# Patient Record
Sex: FEMALE | Race: WHITE | NOT HISPANIC OR LATINO | Employment: OTHER | URBAN - NONMETROPOLITAN AREA
[De-identification: names, ages, dates, MRNs, and addresses within clinical notes are randomized per-mention and may not be internally consistent; named-entity substitution may affect disease eponyms.]

---

## 2017-01-24 ENCOUNTER — OFFICE VISIT - GICH (OUTPATIENT)
Dept: FAMILY MEDICINE | Facility: OTHER | Age: 37
End: 2017-01-24

## 2017-01-24 ENCOUNTER — HISTORY (OUTPATIENT)
Dept: FAMILY MEDICINE | Facility: OTHER | Age: 37
End: 2017-01-24

## 2017-01-24 DIAGNOSIS — F41.8 OTHER SPECIFIED ANXIETY DISORDERS: ICD-10-CM

## 2017-01-24 DIAGNOSIS — Z30.09 ENCOUNTER FOR OTHER GENERAL COUNSELING AND ADVICE ON CONTRACEPTION: ICD-10-CM

## 2017-01-24 ASSESSMENT — ANXIETY QUESTIONNAIRES
6. BECOMING EASILY ANNOYED OR IRRITABLE: SEVERAL DAYS
3. WORRYING TOO MUCH ABOUT DIFFERENT THINGS: SEVERAL DAYS
2. NOT BEING ABLE TO STOP OR CONTROL WORRYING: SEVERAL DAYS
7. FEELING AFRAID AS IF SOMETHING AWFUL MIGHT HAPPEN: SEVERAL DAYS
GAD7 TOTAL SCORE: 8
5. BEING SO RESTLESS THAT IT IS HARD TO SIT STILL: NEARLY EVERY DAY
1. FEELING NERVOUS, ANXIOUS, OR ON EDGE: NOT AT ALL
4. TROUBLE RELAXING: SEVERAL DAYS

## 2017-01-31 ENCOUNTER — COMMUNICATION - GICH (OUTPATIENT)
Dept: FAMILY MEDICINE | Facility: OTHER | Age: 37
End: 2017-01-31

## 2017-01-31 DIAGNOSIS — F41.8 OTHER SPECIFIED ANXIETY DISORDERS: ICD-10-CM

## 2017-04-12 ENCOUNTER — AMBULATORY - GICH (OUTPATIENT)
Dept: FAMILY MEDICINE | Facility: OTHER | Age: 37
End: 2017-04-12

## 2017-07-07 ENCOUNTER — AMBULATORY - GICH (OUTPATIENT)
Dept: FAMILY MEDICINE | Facility: OTHER | Age: 37
End: 2017-07-07

## 2017-09-25 ENCOUNTER — AMBULATORY - GICH (OUTPATIENT)
Dept: FAMILY MEDICINE | Facility: OTHER | Age: 37
End: 2017-09-25

## 2017-12-11 ENCOUNTER — HISTORY (OUTPATIENT)
Dept: FAMILY MEDICINE | Facility: OTHER | Age: 37
End: 2017-12-11

## 2017-12-11 ENCOUNTER — OFFICE VISIT - GICH (OUTPATIENT)
Dept: FAMILY MEDICINE | Facility: OTHER | Age: 37
End: 2017-12-11

## 2017-12-11 DIAGNOSIS — Z00.00 ENCOUNTER FOR GENERAL ADULT MEDICAL EXAMINATION WITHOUT ABNORMAL FINDINGS: ICD-10-CM

## 2017-12-11 DIAGNOSIS — Z30.09 ENCOUNTER FOR OTHER GENERAL COUNSELING AND ADVICE ON CONTRACEPTION: ICD-10-CM

## 2017-12-11 DIAGNOSIS — Z30.40 ENCOUNTER FOR SURVEILLANCE OF CONTRACEPTIVES: ICD-10-CM

## 2017-12-28 NOTE — PROGRESS NOTES
Patient Information     Patient Name MRN Sex Abril Calhoun [id number] Female 1980      Progress Notes by Romana Nieves RN at 2017  8:45 AM     Author:  Romana Nieves RN Service:  (none) Author Type:  NURS- Registered Nurse     Filed:  2017  8:50 AM Encounter Date:  2017 Status:  Signed     :  Romana Nieves RN (NURS- Registered Nurse)            Patient requests ongoing injections of Depo-Provera  Date of last DMPA:    Adverse reaction to last shot?  no  Heavy bleeding or more than 14 days bleeding since last shot?  no  Wants to continue contraception for another 3 months, knowing that fertility may not return for up to 18 months?  yes  Recent migraine headaches?  no  Breast problems since last shot?  no  Problems with excessive hair loss, acne or facial hair?  No    Reminder card given for next shot, had annual med review appt in 2017    ROMANA NIEVES RN ....................  2017   8:49 AM

## 2017-12-30 NOTE — NURSING NOTE
Patient Information     Patient Name MRN Abril Hayden [id number] Female 1980      Nursing Note by Silvina Collazo RN at 2017  8:30 AM     Author:  Silvina Collazo RN Service:  (none) Author Type:  NURS- Registered Nurse     Filed:  2017  8:35 AM Encounter Date:  2017 Status:  Signed     :  Silvina Collazo RN (NURS- Registered Nurse)            Patient requests ongoing injections of Depo-Provera  Date of last DMPA:    Adverse reaction to last shot?  no  Heavy bleeding or more than 14 days bleeding since last shot?  no  Wants to continue contraception for another 3 months, knowing that fertility may not return for up to 18 months?  yes  Recent migraine headaches?  no  Breast problems since last shot?  no  Problems with excessive hair loss, acne or facial hair?  No     SILVINA COLLAZO RN ....................  2017   8:29 AM       pt informed she will need to have a new provider before further injections are given for her orders.

## 2018-01-03 NOTE — NURSING NOTE
Patient Information     Patient Name MRN Abril Hayden [id number] Female 1980      Nursing Note by Jenniffer Barnes at 2017  9:15 AM     Author:  Jenniffer Barnes Service:  (none) Author Type:  (none)     Filed:  2017  9:18 AM Encounter Date:  2017 Status:  Signed     :  Jenniffer Barnes            Patient presents today to get her Depo shot and to talk about getting something for her anxiety of flying as she is going on a trip to [place].  Jenniffer Barnes LPN  2017  9:03 AM

## 2018-01-03 NOTE — NURSING NOTE
Patient Information     Patient Name MRN Abril Hayden [id number] Female 1980      Nursing Note by Jenniffer Barnes at 2017  9:15 AM     Author:  Jenniffer Barnes Service:  (none) Author Type:  (none)     Filed:  2017  9:56 AM Encounter Date:  2017 Status:  Signed     :  Jenniffer Barnes            Depo given today.  Next Depo is due between -2017.  Jenniffer Barnes LPN  2017  9:56 AM

## 2018-01-03 NOTE — PROGRESS NOTES
Patient Information     Patient Name MRN Abril Hayden [id number] Female 1980      Progress Notes by Deya Aponte MD at 2017  9:15 AM     Author:  Deya Aponte MD Service:  (none) Author Type:  Physician     Filed:  2017  1:18 PM Encounter Date:  2017 Status:  Signed     :  Deya Aponte MD (Physician)              SUBJECTIVE:      Abril Schulz is a 36 y.o. female who presents for an update exam and review of medications.    HPI: Healthy 36-year-old who is here for the above. She continues on Depo-Provera for contraception and also cycle control. She continues to be pleased with this, is amenorrheic, feels well. She has not had difficulty with mood changes or weight gain.  Her only concern today is that she would like to discuss an upcoming trip. She and her family are going to [place]. She is concerned about air travel, as well as the necessity of going on rides. She tends to have panicky symptoms when she is required to do those things. In the past, has used alprazolam on a very intermittent basis for air travel. She does well with this with control of anxiety symptoms, but no excess sedation. She does not anticipate any other problems and does not have symptoms of depression or anxiety when at home. Does have difficulty with motion sickness as well.  She continues to smoke about 1 pack per day and is considering using the trip to [place] as a time to quit. She has used nicotine patches in the past and will probably do so again.      PAST MEDICAL HISTORY:  Past Medical History     Diagnosis  Date     History of cervical dysplasia        SURGICAL HISTORY:  Past Surgical History       Procedure   Laterality Date     Leep procedure   2002     Knee arthroscopy   2007     right         SOCIAL HISTORY:  Social History        Substance Use Topics          Smoking status:   Current Every Day Smoker      Packs/day:  1.00      Years:  16.00      Types:   Cigarettes      Smokeless tobacco:   Never Used       Comment: has been trying       Alcohol use   0.0 oz/week     0 Standard drinks or equivalent per week        Comment: occasionally, maybe four per month       Social History Narrative     with one child.      Self-employed doing books for 's business.      FAMILY HISTORY:   Family Status     Relation  Status     Father Alive     Mother Alive    MS      Sister Alive     Brother Alive     Son Alive     Maternal Grandmother Alive     Maternal Grandfather      Paternal Grandmother      Paternal Grandfather      PMH, PSH, FMH, SH reviewed and updated.    CURRENT MEDICATIONS:   Current Outpatient Prescriptions       Medication  Sig Dispense Refill     ALPRAZolam (XANAX) 0.5 mg tablet Take 1 tablet by mouth 2 times daily if needed for Anxiety. 15 tablet 0     scopolamine 1.5mg (TRANSDERM SCOP) 1.5 mg (1 mg over 3 days) patch Apply 1 Patch on dry, clean, hairless skin every 72 hours. 4 Patch 0     Current Facility-Administered Medications         Medication  Dose Route Frequency Provider Last Rate     medroxyPROGESTERone acetate (contraceptive) 150 mg injection (DEPO-PROVERA)  150 mg Intra-Muscular q 12 weeks Deya Aponte MD       Medications have been reviewed by me and are current to the best of my knowledge and ability.    ALLERGIES:  Review of patient's allergies indicates no known allergies.    IMMUNIZATIONS:   Immunization History     Administered  Date(s) Administered     MMR 1993, 10/05/1993     Td (Age >=7 Years) 2006     Tdap 2015       REVIEW OF SYSTEMS:  General: Denies involuntary weight change, fevers, or unusual fatigue  Eyes: Denies difficulty with vision  Ears/Nose/Throat: Denies difficulty with ears or hearing. Denies difficulty swallowing.  Cardiovascular: Denies chest pain, palpitations, or exercise intolerance  Respiratory: Denies chronic cough, wheezing, or shortness of  "breath  Gastrointestinal: Denies abdominal pain, nausea, troublesome reflux, or change in bowel habits  GYN: Amenorrheic on Depo-Provera  Musculoskeletal: Denies joint pain or swelling  Skin: Denies rash  Neurologic: Denies troublesome headaches or focal neurologic symptoms  Allergic/Immunologic: Denies seasonal or perennial allergy symptoms  Psychiatric: See HPI    PHQ Depression Screening 6/3/2016   Date of PHQ exam (doc flow) 6/3/2016   1. Lack of interest/pleasure 0 - Not at all   2. Feeling down/depressed 0 - Not at all   PHQ-2 TOTAL SCORE 0         OBJECTIVE:  /76  Pulse 80  Ht 1.67 m (5' 5.75\")  Wt 67.2 kg (148 lb 4 oz)  BMI 24.11 kg/m2    EXAM:   General Appearance: Alert, well-appearing woman in NAD  HEENT: Normocephalic. TMs clear with normal landmarks. PERRLA, EOMI. Pharynx pink and moist with dentition in good repair  Neck: Supple without adenopathy or thyromegaly  Chest/Respiratory: Clear with good air exchange  Cardiovascular: Heart rate regular, normal S1 and S2, no murmur. Peripheral pulses full and symmetric  Abdomen: Soft with no mass, tenderness, or HSM  Extremities: Without deformity or edema  Skin: No rash, open areas, or suspicious lesions  Neurologic Exam: Alert and oriented ×3. No focal deficits. DTRs symmetric.  Psychiatric Exam: Alert and oriented, appropriate affect.      ASSESSMENT/PLAN:  1. CONTRACEPTIVE MANAGEMENT   - doing well on Depo-Provera. Continue same.    2. Situational anxiety   - discussed in detail. She will use transdermal scopolamine for motion sickness and is also provided with alprazolam 0.5 mg, #15, to use pre-flight and if needed during the vacation. Discussed about stopping smoking might increase her anxiety. Reviewed the use of nicotine patches.      Health maintenance reviewed and up to date except for flu shot, which she declines.          "

## 2018-01-03 NOTE — TELEPHONE ENCOUNTER
Patient Information     Patient Name MRN Sex Abril Calhoun [id number] Female 1980      Telephone Encounter by Rajani Wilkinson RN at 2017  1:39 PM     Author:  Rajani Wilkinson RN Service:  (none) Author Type:  NURS- Registered Nurse     Filed:  2017  1:43 PM Encounter Date:  2017 Status:  Signed     :  Rajani Wilkinson RN (NURS- Registered Nurse)            [facility] does not carry scopolamine patches. Medication not on RN refill protocol. New order pending to be [facility] to [facility] per patient request.  Rajani Wilkinson RN........2017 1:41 PM

## 2018-01-04 NOTE — NURSING NOTE
Patient Information     Patient Name MRN Abril Hayden [id number] Female 1980      Nursing Note by Silvina Collazo RN at 2017  8:30 AM     Author:  Silvina Collazo RN Service:  (none) Author Type:  NURS- Registered Nurse     Filed:  2017  8:31 AM Encounter Date:  2017 Status:  Signed     :  Silvina Collazo RN (NURS- Registered Nurse)            Patient requests ongoing injections of Depo-Provera  Date of last DMPA:    Adverse reaction to last shot?  no  Heavy bleeding or more than 14 days bleeding since last shot?  no  Wants to continue contraception for another 3 months, knowing that fertility may not return for up to 18 months?  yes  Recent migraine headaches?  no  Breast problems since last shot?  no  Problems with excessive hair loss, acne or facial hair?  No     SILVINA COLLAZO RN ....................  2017   8:27 AM

## 2018-01-24 ENCOUNTER — DOCUMENTATION ONLY (OUTPATIENT)
Dept: FAMILY MEDICINE | Facility: OTHER | Age: 38
End: 2018-01-24

## 2018-01-24 PROBLEM — Z30.42 DEPO-PROVERA CONTRACEPTIVE STATUS: Status: ACTIVE | Noted: 2017-12-12

## 2018-01-24 PROBLEM — Z72.0 TOBACCO ABUSE: Status: ACTIVE | Noted: 2018-01-24

## 2018-01-24 PROBLEM — R61 GENERALIZED HYPERHIDROSIS: Status: ACTIVE | Noted: 2018-01-24

## 2018-01-24 RX ORDER — ALPRAZOLAM 0.5 MG
0.5 TABLET ORAL 2 TIMES DAILY PRN
COMMUNITY
Start: 2017-01-24 | End: 2020-07-09

## 2018-01-24 RX ORDER — SCOLOPAMINE TRANSDERMAL SYSTEM 1 MG/1
1 PATCH, EXTENDED RELEASE TRANSDERMAL
COMMUNITY
Start: 2017-01-31 | End: 2020-07-09

## 2018-01-24 RX ORDER — MEDROXYPROGESTERONE ACETATE 150 MG/ML
150 INJECTION, SUSPENSION INTRAMUSCULAR
COMMUNITY
Start: 2017-12-11 | End: 2020-07-09

## 2018-01-24 RX ORDER — MEDROXYPROGESTERONE ACETATE 150 MG/ML
150 INJECTION, SUSPENSION INTRAMUSCULAR
COMMUNITY
Start: 2015-07-10 | End: 2020-07-09

## 2018-01-27 VITALS
SYSTOLIC BLOOD PRESSURE: 120 MMHG | HEIGHT: 66 IN | HEART RATE: 80 BPM | BODY MASS INDEX: 23.83 KG/M2 | DIASTOLIC BLOOD PRESSURE: 76 MMHG | WEIGHT: 148.25 LBS

## 2018-01-27 VITALS — WEIGHT: 145.2 LBS | SYSTOLIC BLOOD PRESSURE: 110 MMHG | DIASTOLIC BLOOD PRESSURE: 70 MMHG

## 2018-02-03 ASSESSMENT — ANXIETY QUESTIONNAIRES: GAD7 TOTAL SCORE: 8

## 2018-02-09 VITALS
WEIGHT: 150.4 LBS | HEIGHT: 66 IN | HEART RATE: 80 BPM | DIASTOLIC BLOOD PRESSURE: 66 MMHG | BODY MASS INDEX: 24.17 KG/M2 | SYSTOLIC BLOOD PRESSURE: 112 MMHG

## 2018-02-12 NOTE — PROGRESS NOTES
Patient Information     Patient Name MRN Sex     Abril Schulz [id number] Female 1980      Progress Notes by Gloria Laura PA-C at 2017  8:45 AM     Author:  Gloria Laura PA-C Service:  (none) Author Type:  PHYS- Physician Assistant     Filed:  2017  2:27 PM Encounter Date:  2017 Status:  Signed     :  Gloria Laura PA-C (PHYS- Physician Assistant)            Nursing Notes:   Abril Bryant  2017  8:55 AM  Signed  Patient presents to the clinic to establish care with Gloria Bryant LPN........................2017  8:44 AM      ANNUAL PHYSICAL - FEMALE    HPI: Abril Schulz is a 37 y.o. female who presents for a yearly exam.  Concerns include: No concerns at this time.    No LMP recorded. Patient is not currently having periods (Reason: Hormonally Suppressed).   Contraception: depo, need repeat. On depo for 18 years. On it for 18 years.   Risk for STI?: no  Last pap: 2015 - nl pap and HPV - repeat in 5 years  Any hx of abnormal paps:  Many years ago, no recent abnormal pap test.  FH of early CA?: no  Cholesterol/DM concerns/screening: none, declined today  Tobacco?: yes  Calcium intake: yes  DEXA: na  Last mammo: na  Colonoscopy: na  Immunizations: UTD    Patient Active Problem List      Diagnosis Date Noted     Depo-Provera contraceptive status 2017     HYPERHIDROSIS      TOBACCO ABUSE        Past Medical History:     Diagnosis  Date     History of cervical dysplasia        Past Surgical History:      Procedure  Laterality Date     KNEE ARTHROSCOPY  2007    right       LEEP PROCEDURE  2002       Social History     Social History        Marital status:       Spouse name: N/A     Number of children:  N/A     Years of education:  N/A     Occupational History      Not on file.     Social History Main Topics          Smoking status:   Current Every Day Smoker      Packs/day:  1.00      Years:  16.00      Types:   "Cigarettes      Smokeless tobacco:   Never Used       Comment: has been trying       Alcohol use   0.0 oz/week     0 Standard drinks or equivalent per week        Comment: occasionally, maybe four per month       Drug use:   No      Sexual activity:   Yes      Partners:  Male      Other Topics  Concern     Not on file      Social History Narrative      with one child.      Self-employed doing books for 's business.               Family History       Problem   Relation Age of Onset     Good Health  Father      Other  Mother      MS in remission       Endometriosis  Sister      Good Health  Brother      Heart Disease  Son       Bicuspid aortic valve       Cancer  Maternal Grandmother      Cancer  Maternal Grandfather      Cancer  Paternal Grandmother 95     Cancer  Paternal Grandfather 97       Current Outpatient Prescriptions       Medication  Sig Dispense Refill     ALPRAZolam (XANAX) 0.5 mg tablet Take 1 tablet by mouth 2 times daily if needed for Anxiety. 15 tablet 0     scopolamine 1.5mg (TRANSDERM SCOP) 1.5 mg (1 mg over 3 days) patch Apply 1 Patch on dry, clean, hairless skin every 72 hours. 4 Patch 0     Current Facility-Administered Medications         Medication  Dose Route Frequency Provider Last Rate     medroxyPROGESTERone acetate (contraceptive) 150 mg injection (DEPO-PROVERA)  150 mg Intra-Muscular q 12 weeks Deya Aponte MD       Medications have been reviewed by me and are current to the best of my knowledge and ability.       REVIEW OF SYSTEMS:  Refer to HPI.    PHYSICAL EXAM:  /66 (Cuff Site: Right Arm, Position: Sitting, Cuff Size: Adult Regular)  Pulse 80  Ht 1.664 m (5' 5.5\")  Wt 68.2 kg (150 lb 6.4 oz)  Breastfeeding? No  BMI 24.65 kg/m2  CONSTITUTIONAL:  Alert, cooperative, NAD.  EYES: No scleral icterus.  PERRLA.  Conjunctiva clear.  ENT/MOUTH: External ears and nose normal.  TMs normal.  Moist mucous membranes. Oropharynx clear.    ENDO: No thyromegaly or thyroid " nodules.  LYMPH:  No cervical or supraclavicular LA.    BREASTS: No skin abnormalities, no erythema.  No discrete masses.  No nipple discharge, no axillary, supra- or infraclavicular LA.   CARDIOVASCULAR: Regular, S1, S2.  No S3 or S4.  No murmur/gallop/rub.  No peripheral edema.  RESPIRATORY: CTA bilaterally, no wheezes, rhonchi or rales.  GI: Bowel sounds wnl.  Soft, nontender, nondistended.  No masses or HSM.  No rebound or guarding.  : Vulva: normal, no lesions or discharge  Urethral meatus: normal size and location, no lesions or discharge  Urethra: no tenderness or masses  Bladder: no fullness or tenderness  Uterus: normal size and position, mobile, non-tender  Adnexa: no palpable masses bilaterally. No cervical motion tenderness.  Pap smear obtained: no  MSKEL: Grossly normal ROM.  No clubbing.  INTEGUMENTARY:  Warm, dry.  No rash noted on exposed skin.  NEUROLOGIC: Facies symmetric.  Grossly normal movement and tone.  No tremor.  PSYCHIATRIC: Affect normal.  Speech fluent.      PHQ Depression Screen  Date of PHQ exam: 12/11/17  Over the last 2 weeks, how often have you been bothered by any of the following problems?  1. Little interest or pleasure in doing things: 0 - Not at all  2. Feeling down, depressed, or hopeless: 0 - Not at all         ASSESSMENT/PLAN:    ICD-10-CM    1. Physical exam, annual Z00.00    2. Depo-Provera contraceptive status Z30.40 medroxyPROGESTERone acetate (contraceptive) 150 mg injection (DEPO-PROVERA)   3. Birth control counseling Z30.09 AMB CONSULT TO OB-GYN       discussed her Depo-Provera birth control at length. Patient has tolerated Depo-Provera in the past. Gave side effect profile of the medication. Encouraged her to have a consult with OB/GYN in order to discuss risk factors of the Depo-Provera since she is 37 years of age and is a smoker. Encouraged her to quit smoking. Encouraged her to think about having an IUD placed as this will decrease her risk factors especially in  "the area of osteoporosis.    Gave depo today. Gave side effect profile.   Return to clinic with change/worsening of symptoms.   Return in 1 year for repeat physical. Encouraged to come in fasting for her next physical in order to have her cholesterol and diabetes check completed.    Patient Instructions   Referred for IUD consult/birth control discussion.     Healthy Strategies  1. Eat at least 3 meals a day and never skip breakfast.  2. Eat more slowly.  3. Decrease portion size.  4. Provide structure by using meal replacement bars or shakes, and/or low calorie frozen meals.  5. For good nutrition incorporate fruit, vegetables, whole grains, lean protein, and low-fat dairy.  6. Remove trigger foods from your environment to avoid impulse eating.  7. Increase physical activity: get a pedometer and aim for 10,000 steps a day or 30-35 minutes of activity 5 days per week.  8. Weigh yourself daily or at least weekly.  9. Keep a record of what you eat and your activity.  10. Establish a support system such as a friend, group or program.  11. Read [employer] [place]'s \"Eat to Live\". Remember it is important to have a minimum of 1200 calories a day, okay to use olive oil, 40 grams of fiber daily. No more than two servings ( the size of your palm) of red meat a week.     Please consider the following general health recommendations:    Eat a quality diet (generally, low in simple sugars, starches, cholesterol and saturated fat.)    Please get 1500 mg of calcium in divided doses with 1500 units vitamin D in your diet daily.     Stay physically active. Regular walking or other exercise is one of the best ways to minimize pain of arthritis; maintain independence and mobility; maintain bone strength; maintain conditioning of your heart. Find something you enjoy and a friend to do it with you.    Maintain ideal weight. Your Body mass index is Body mass index is 24.65 kg/(m^2).. Generally a BMI of 20-25 is considered ideal. Overweight is " defined as 25-30, Obese is 30-35 and markedly obese is greater than 35.    Apply sun block (SPF 25 or greater) on exposed skin anytime you are out in the sun to prevent skin cancer.     Wear a seatbelt whenever you are in a car.    Schedule a mammogram annually starting at the age of 40 years old unless recommended earlier by your primary care provider. Come for a general exam once yearly.    Colonoscopy (an exam of the colon) is recommended every 10 years after the age of 50 to screen for colon cancer. (More often if you are at increased risk.)    Consider a bone density study every 2-5 years to see if you are at increased risk for fracture. If you have osteoporosis, medicine to strengthen your bones can significantly reduce your risk of fracture, back pain, and loss of height.    A vaccine to reduce your chances of getting shingles is available if you are over 50. Information about the vaccine is available through the clinic or at:  (http://www.nlm.nih.gov/medlineplus/druginfo/medmaster/c935368.html)    Receive a pneumonia shot once after the age of 65 (repeat in 5 years if you have other risk factors). This does not prevent all types of pneumonia, but reduces the risk of the worst bacterial cause of pneumonia.    You should have a tetanus booster at least once every 10 years.    Obtain a flu shot every fall.    Check blood sugar annually. Cholesterol annually unless you have had a normal level when last checked within 5 years.     I recommend that you have a general physical exam every year. You should have a pap every 3-5 years between the ages of 21 and 65 depending on your test unless you have had previous abnormal pap smear, (in these cases the exams and PAP's should be done yearly). If you have had hysterectomy in the past, your future Pap plan may be different.            Index [place] Related topics   Intrauterine Device (IUD)   ________________________________________________________________________  RITTER  POINTS    An intrauterine device (IUD) is a T-shaped birth control device put into a woman's uterus by her healthcare provider. IUDs don t keep you from getting HIV/AIDS or other sexually transmitted diseases.    IUDs offer long-lasting birth control. Usually progesterone IUDs are replaced after 3 to 5 years, depending on the brand. Copper IUDs need to be replaced after 10 years.    You need to see your healthcare provider regularly for checkups as long as you have the IUD in place.  ________________________________________________________________________  What is an intrauterine device (IUD)?  An intrauterine device (IUD) is a birth control device put into a woman's uterus by her healthcare provider. The uterus is the muscular organ at the top of the vagina. Babies grow in the uterus, and menstrual blood comes from the uterus, through the cervix. It is T-shaped with a string attached. There are 2 types of IUDs.     IUDs that contain copper    IUDs that contain the female hormone progesterone  The IUD prevents pregnancy in several ways:    It kills or damages sperm to keep them from reaching your eggs.    It can slow the movement of eggs to your uterus.    It can make the eggs unable to be fertilized even if sperm are present.    It may keep a fertilized egg from implanting in the uterus.  Talk to your healthcare provider about the risks of using an IUD if:    You have cancer in the uterus or cervix    You have unexplained vaginal bleeding    You may be pregnant    You have had pelvic inflammatory disease    You have had a severe infection of the cervix    You have growths called fibroids or other problems with your uterus that make it hard to insert the IUD    You change sexual partners often or if you or your partner has more than 1 sexual partner    You have had either a pregnancy or infection in the fallopian tubes that lead from the ovaries to the uterus  You should not use a copper IUD if you are allergic to  copper or metals.  How is it used?  Your healthcare provider will put the IUD into your uterus through the vagina and cervix (the cervix is the opening of the uterus). The IUD is usually inserted during a menstrual period, when the cervix is slightly open and you are least likely to be pregnant. It takes only a few minutes to insert an IUD. You may feel some cramping pain while it is being put into place.  The copper IUD can also be used for emergency birth control. It can be inserted up to 5 days after unprotected sex. Studies have shown it works to prevent pregnancy 99.9% of the time when it is used in this way.  What happens after I get an IUD?  The IUD could come out accidentally in the first few months after its placement, possibly without your knowing it. You might want to use a backup method of birth control during the first few months, just to be safe.  Your healthcare provider may want to examine you within 3 months after the insertion of the IUD to be sure that everything is normal. You need to see your healthcare provider regularly for checkups as long as you have the IUD in place. Talk with your provider about this.  During the first few months after insertion of the IUD, check often for the string attached to it to be sure that the IUD is still in the uterus. The string passes from the IUD inside the uterus through the cervix and into the vagina. The end of the string usually stays out of the way at the top of the vagina. You can check for the string by putting a finger inside the vagina and feeling for the string near the cervix. Be careful not to pull on the string. Also check for the string after every menstrual period and before you have sex. As long as you can feel the string, the IUD is in position and it is unlikely that you will get pregnant. If you feel the hard plastic of the IUD, it is no longer in the right place and you need to see your healthcare provider to change it.  Usually  progesterone IUDs are replaced after 3 to 5 years, depending on the brand. Copper IUDs need to be replaced after 10 years. Removal or replacement of an IUD must be done by your healthcare provider. Do not try to remove the IUD yourself.  What are the benefits?    Fewer than 1 out of 100 women who use an IUD for birth control get pregnant during 1 year of use.    It does not cost much.    Sex does not need to be interrupted by putting on a condom, or using a diaphragm or spermicide.    If you have a progesterone-containing IUD, you will have less bleeding and cramps during your periods. Sometimes you may skip menstrual periods with this type of IUD.    An IUD starts preventing pregnancy as soon as it is inserted and continues to prevent pregnancy for 3 to 10 years, depending on the type.    If you want to have children, your ability to get pregnant returns quickly after the IUD is removed.  What are the risks?    You may have spotting between periods or increased menstrual bleeding and cramps, mostly during the first few months of use.    The string may irritate your partner's penis.    The IUD may get stuck in the uterine wall, or damage the uterus or other organs. Talk with your healthcare provider before you have deep heat treatment or therapeutic ultrasound while your IUD is in place. These treatments may increase the risk of injury to the tissues of the uterus.    You may have a higher risk of pelvic inflammatory disease, which can lead to being unable to get pregnant.    If the IUD falls out, you may have an unexpected pregnancy.    IUDs don t keep you from getting infected with HIV/AIDS or other sexually transmitted diseases.    Latex or polyurethane condoms are the only method of birth control that can protect against HIV/AIDS.    If you get pregnant with an IUD in place, tell your healthcare provider right away. The IUD should be removed right away to prevent a tubal (ectopic) pregnancy, miscarriage (loss of  the baby), infection in the uterus, or premature birth of the baby.  Developed by [employer].  Adult Advisor 2017.2 published by [employer].  Last modified: 2016-11-02  Last reviewed: 2016-10-31  This content is reviewed periodically and is subject to change as new health information becomes available. The information is intended to inform and educate and is not a replacement for medical evaluation, advice, diagnosis or treatment by a healthcare professional.  References   Adult Advisor 2017.2 Index    Copyright   2017 [employer], a division of [employer] All rights reserved.          Index [place] Related topics   Depo-Provera   ________________________________________________________________________  KEY POINTS    Depo-Provera is a shot that contains a form of the female hormone progesterone. The shot is given every 3 months to prevent pregnancy.    Depo-Provera may increase your risk for heart disease. It may also increase your risk for stroke, blood clots, some liver problems, and possibly depression or diabetes.    Ask your healthcare provider or pharmacist what side effects the medicine may cause and what you should do if you have side effects.  ________________________________________________________________________  What is Depo-Provera used for?  Depo-Provera is a shot used to prevent pregnancy. You need to get the shot every 3 months (4 times a year) from your healthcare provider. The shot is usually given during the first 5 days of your menstrual period.  Depo-Provera is one of the most reliable forms of birth control.  How does it work?  Depo-Provera is a man-made form of the female hormone progesterone. Progesterone is one of the hormones used in birth control pills. After a shot of Depo-Provera, the high level of progesterone in the body keeps the ovaries from releasing an egg for the next 3 months. If the ovaries don t release an egg, you cannot get pregnant. The hormone also causes  a thickening of the mucus on the cervix and changes the lining of the uterus. These changes also help prevent pregnancy.  When you take Depo-Provera, your periods may be lighter and less painful. The symptoms of premenstrual syndrome (PMS) may not be as bothersome.  What else do I need to know about this medicine?    Depo-Provera may increase your risk for heart disease. It may also increase your risk for stroke, blood clots, some liver problems, and possibly depression or diabetes. You and your healthcare provider should discuss the risks and benefits of taking Depo-Provera.    Follow the directions that come with your medicine, including information about food or alcohol. Make sure you know how and when to take your medicine. Remember that you may not be protected from pregnancy if you do not get your shots on schedule.    This medicine does not keep you from getting AIDS or other sexually transmitted diseases. Latex or polyurethane condoms are the only method of birth control that can protect against the HIV virus and AIDS.    This medicine may decrease the amount of calcium in your bones. This may give you a higher risk of osteoporosis and broken bones. Ask your healthcare provider if you need preventive care for this.    Many medicines have side effects. A side effect is a symptom or problem that is caused by the medicine. Ask your healthcare provider or pharmacist what side effects the medicine may cause and what you should do if you have side effects.    Smoking while you are using this medicine increases the risk of serious side effects. Talk to your healthcare provider about ways to quit smoking.    Keep a list of your medicines with you. List all of the prescription medicines, nonprescription medicines, supplements, natural remedies, and vitamins that you take. Tell all healthcare providers who treat you about all of the products you are taking.    Try to get all of your prescriptions filled at the same  place. Your pharmacist can help make sure that all of your medicines are safe to take together.  If you have any questions, ask your healthcare provider or pharmacist for more information. Be sure to keep all appointments for provider visits or tests.  Developed by [employer].  Adult Advisor 2017.2 published by [employer].  Last modified: 2016-02-23  Last reviewed: 2017-03-15  This content is reviewed periodically and is subject to change as new health information becomes available. The information is intended to inform and educate and is not a replacement for medical evaluation, advice, diagnosis or treatment by a healthcare professional.  References   Adult Advisor 2017.2 Index    Copyright   2017 [employer], a division of [employer] All rights reserved.           Relevant cancer screening discussed.    Counseled on healthy diet, Calcium and vitamin D intake, and exercise.    Gloria Laura PA-C ....................  12/11/2017   8:55 AM

## 2018-02-12 NOTE — PATIENT INSTRUCTIONS
"Patient Information     Patient Name MRAbril Calhoun [id number] Female 1980      Patient Instructions by Gloria Laura PA-C at 2017  9:12 AM     Author:  Gloria Laura PA-C  Service:  (none) Author Type:  PHYS- Physician Assistant     Filed:  2017  9:12 AM  Encounter Date:  2017 Status:  Addendum     :  Gloria Laura PA-C (PHYS- Physician Assistant)        Related Notes: Original Note by Gloria Laura PA-C (PHYS- Physician Assistant) filed at 2017  9:12 AM            Referred for IUD consult/birth control discussion.     Healthy Strategies  1. Eat at least 3 meals a day and never skip breakfast.  2. Eat more slowly.  3. Decrease portion size.  4. Provide structure by using meal replacement bars or shakes, and/or low calorie frozen meals.  5. For good nutrition incorporate fruit, vegetables, whole grains, lean protein, and low-fat dairy.  6. Remove trigger foods from your environment to avoid impulse eating.  7. Increase physical activity: get a pedometer and aim for 10,000 steps a day or 30-35 minutes of activity 5 days per week.  8. Weigh yourself daily or at least weekly.  9. Keep a record of what you eat and your activity.  10. Establish a support system such as a friend, group or program.  11. Read [place]'s \"Eat to Live\". Remember it is important to have a minimum of 1200 calories a day, okay to use olive oil, 40 grams of fiber daily. No more than two servings ( the size of your palm) of red meat a week.     Please consider the following general health recommendations:    Eat a quality diet (generally, low in simple sugars, starches, cholesterol and saturated fat.)    Please get 1500 mg of calcium in divided doses with 1500 units vitamin D in your diet daily.     Stay physically active. Regular walking or other exercise is one of the best ways to minimize pain of arthritis; maintain independence and mobility; maintain bone strength; maintain " conditioning of your heart. Find something you enjoy and a friend to do it with you.    Maintain ideal weight. Your Body mass index is Body mass index is 24.65 kg/(m^2).. Generally a BMI of 20-25 is considered ideal. Overweight is defined as 25-30, Obese is 30-35 and markedly obese is greater than 35.    Apply sun block (SPF 25 or greater) on exposed skin anytime you are out in the sun to prevent skin cancer.     Wear a seatbelt whenever you are in a car.    Schedule a mammogram annually starting at the age of 40 years old unless recommended earlier by your primary care provider. Come for a general exam once yearly.    Colonoscopy (an exam of the colon) is recommended every 10 years after the age of 50 to screen for colon cancer. (More often if you are at increased risk.)    Consider a bone density study every 2-5 years to see if you are at increased risk for fracture. If you have osteoporosis, medicine to strengthen your bones can significantly reduce your risk of fracture, back pain, and loss of height.    A vaccine to reduce your chances of getting shingles is available if you are over 50. Information about the vaccine is available through the clinic or at:  (http://www.nlm.nih.gov/medlineplus/druginfo/medmaster/k121206.html)    Receive a pneumonia shot once after the age of 65 (repeat in 5 years if you have other risk factors). This does not prevent all types of pneumonia, but reduces the risk of the worst bacterial cause of pneumonia.    You should have a tetanus booster at least once every 10 years.    Obtain a flu shot every fall.    Check blood sugar annually. Cholesterol annually unless you have had a normal level when last checked within 5 years.     I recommend that you have a general physical exam every year. You should have a pap every 3-5 years between the ages of 21 and 65 depending on your test unless you have had previous abnormal pap smear, (in these cases the exams and PAP's should be done  yearly). If you have had hysterectomy in the past, your future Pap plan may be different.            Index [place] Related topics   Intrauterine Device (IUD)   ________________________________________________________________________  KEY POINTS    An intrauterine device (IUD) is a T-shaped birth control device put into a woman's uterus by her healthcare provider. IUDs don t keep you from getting HIV/AIDS or other sexually transmitted diseases.    IUDs offer long-lasting birth control. Usually progesterone IUDs are replaced after 3 to 5 years, depending on the brand. Copper IUDs need to be replaced after 10 years.    You need to see your healthcare provider regularly for checkups as long as you have the IUD in place.  ________________________________________________________________________  What is an intrauterine device (IUD)?  An intrauterine device (IUD) is a birth control device put into a woman's uterus by her healthcare provider. The uterus is the muscular organ at the top of the vagina. Babies grow in the uterus, and menstrual blood comes from the uterus, through the cervix. It is T-shaped with a string attached. There are 2 types of IUDs.     IUDs that contain copper    IUDs that contain the female hormone progesterone  The IUD prevents pregnancy in several ways:    It kills or damages sperm to keep them from reaching your eggs.    It can slow the movement of eggs to your uterus.    It can make the eggs unable to be fertilized even if sperm are present.    It may keep a fertilized egg from implanting in the uterus.  Talk to your healthcare provider about the risks of using an IUD if:    You have cancer in the uterus or cervix    You have unexplained vaginal bleeding    You may be pregnant    You have had pelvic inflammatory disease    You have had a severe infection of the cervix    You have growths called fibroids or other problems with your uterus that make it hard to insert the IUD    You change sexual  partners often or if you or your partner has more than 1 sexual partner    You have had either a pregnancy or infection in the fallopian tubes that lead from the ovaries to the uterus  You should not use a copper IUD if you are allergic to copper or metals.  How is it used?  Your healthcare provider will put the IUD into your uterus through the vagina and cervix (the cervix is the opening of the uterus). The IUD is usually inserted during a menstrual period, when the cervix is slightly open and you are least likely to be pregnant. It takes only a few minutes to insert an IUD. You may feel some cramping pain while it is being put into place.  The copper IUD can also be used for emergency birth control. It can be inserted up to 5 days after unprotected sex. Studies have shown it works to prevent pregnancy 99.9% of the time when it is used in this way.  What happens after I get an IUD?  The IUD could come out accidentally in the first few months after its placement, possibly without your knowing it. You might want to use a backup method of birth control during the first few months, just to be safe.  Your healthcare provider may want to examine you within 3 months after the insertion of the IUD to be sure that everything is normal. You need to see your healthcare provider regularly for checkups as long as you have the IUD in place. Talk with your provider about this.  During the first few months after insertion of the IUD, check often for the string attached to it to be sure that the IUD is still in the uterus. The string passes from the IUD inside the uterus through the cervix and into the vagina. The end of the string usually stays out of the way at the top of the vagina. You can check for the string by putting a finger inside the vagina and feeling for the string near the cervix. Be careful not to pull on the string. Also check for the string after every menstrual period and before you have sex. As long as you can  feel the string, the IUD is in position and it is unlikely that you will get pregnant. If you feel the hard plastic of the IUD, it is no longer in the right place and you need to see your healthcare provider to change it.  Usually progesterone IUDs are replaced after 3 to 5 years, depending on the brand. Copper IUDs need to be replaced after 10 years. Removal or replacement of an IUD must be done by your healthcare provider. Do not try to remove the IUD yourself.  What are the benefits?    Fewer than 1 out of 100 women who use an IUD for birth control get pregnant during 1 year of use.    It does not cost much.    Sex does not need to be interrupted by putting on a condom, or using a diaphragm or spermicide.    If you have a progesterone-containing IUD, you will have less bleeding and cramps during your periods. Sometimes you may skip menstrual periods with this type of IUD.    An IUD starts preventing pregnancy as soon as it is inserted and continues to prevent pregnancy for 3 to 10 years, depending on the type.    If you want to have children, your ability to get pregnant returns quickly after the IUD is removed.  What are the risks?    You may have spotting between periods or increased menstrual bleeding and cramps, mostly during the first few months of use.    The string may irritate your partner's penis.    The IUD may get stuck in the uterine wall, or damage the uterus or other organs. Talk with your healthcare provider before you have deep heat treatment or therapeutic ultrasound while your IUD is in place. These treatments may increase the risk of injury to the tissues of the uterus.    You may have a higher risk of pelvic inflammatory disease, which can lead to being unable to get pregnant.    If the IUD falls out, you may have an unexpected pregnancy.    IUDs don t keep you from getting infected with HIV/AIDS or other sexually transmitted diseases.    Latex or polyurethane condoms are the only method of  birth control that can protect against HIV/AIDS.    If you get pregnant with an IUD in place, tell your healthcare provider right away. The IUD should be removed right away to prevent a tubal (ectopic) pregnancy, miscarriage (loss of the baby), infection in the uterus, or premature birth of the baby.  Developed by [employer].  Adult Advisor 2017.2 published by [employer].  Last modified: 2016-11-02  Last reviewed: 2016-10-31  This content is reviewed periodically and is subject to change as new health information becomes available. The information is intended to inform and educate and is not a replacement for medical evaluation, advice, diagnosis or treatment by a healthcare professional.  References   Adult Advisor 2017.2 Index    Copyright   2017 [employer], a division of [employer] All rights reserved.          Index [place] Related topics   Depo-Provera   ________________________________________________________________________  KEY POINTS    Depo-Provera is a shot that contains a form of the female hormone progesterone. The shot is given every 3 months to prevent pregnancy.    Depo-Provera may increase your risk for heart disease. It may also increase your risk for stroke, blood clots, some liver problems, and possibly depression or diabetes.    Ask your healthcare provider or pharmacist what side effects the medicine may cause and what you should do if you have side effects.  ________________________________________________________________________  What is Depo-Provera used for?  Depo-Provera is a shot used to prevent pregnancy. You need to get the shot every 3 months (4 times a year) from your healthcare provider. The shot is usually given during the first 5 days of your menstrual period.  Depo-Provera is one of the most reliable forms of birth control.  How does it work?  Depo-Provera is a man-made form of the female hormone progesterone. Progesterone is one of the hormones used in birth  control pills. After a shot of Depo-Provera, the high level of progesterone in the body keeps the ovaries from releasing an egg for the next 3 months. If the ovaries don t release an egg, you cannot get pregnant. The hormone also causes a thickening of the mucus on the cervix and changes the lining of the uterus. These changes also help prevent pregnancy.  When you take Depo-Provera, your periods may be lighter and less painful. The symptoms of premenstrual syndrome (PMS) may not be as bothersome.  What else do I need to know about this medicine?    Depo-Provera may increase your risk for heart disease. It may also increase your risk for stroke, blood clots, some liver problems, and possibly depression or diabetes. You and your healthcare provider should discuss the risks and benefits of taking Depo-Provera.    Follow the directions that come with your medicine, including information about food or alcohol. Make sure you know how and when to take your medicine. Remember that you may not be protected from pregnancy if you do not get your shots on schedule.    This medicine does not keep you from getting AIDS or other sexually transmitted diseases. Latex or polyurethane condoms are the only method of birth control that can protect against the HIV virus and AIDS.    This medicine may decrease the amount of calcium in your bones. This may give you a higher risk of osteoporosis and broken bones. Ask your healthcare provider if you need preventive care for this.    Many medicines have side effects. A side effect is a symptom or problem that is caused by the medicine. Ask your healthcare provider or pharmacist what side effects the medicine may cause and what you should do if you have side effects.    Smoking while you are using this medicine increases the risk of serious side effects. Talk to your healthcare provider about ways to quit smoking.    Keep a list of your medicines with you. List all of the prescription  medicines, nonprescription medicines, supplements, natural remedies, and vitamins that you take. Tell all healthcare providers who treat you about all of the products you are taking.    Try to get all of your prescriptions filled at the same place. Your pharmacist can help make sure that all of your medicines are safe to take together.  If you have any questions, ask your healthcare provider or pharmacist for more information. Be sure to keep all appointments for provider visits or tests.  Developed by [employer].  Adult Advisor 2017.2 published by [employer].  Last modified: 2016-02-23  Last reviewed: 2017-03-15  This content is reviewed periodically and is subject to change as new health information becomes available. The information is intended to inform and educate and is not a replacement for medical evaluation, advice, diagnosis or treatment by a healthcare professional.  References   Adult Advisor 2017.2 Index    Copyright   2017 [employer], a division of [employer] All rights reserved.

## 2018-02-12 NOTE — NURSING NOTE
Patient Information     Patient Name MRN Abril Hayden [id number] Female 1980      Nursing Note by Abril Bryant at 2017  8:45 AM     Author:  Abril Bryant Service:  (none) Author Type:  (none)     Filed:  2017  8:55 AM Encounter Date:  2017 Status:  Signed     :  Abril Bryant            Patient presents to the clinic to establish care with Gloria Bryant LPN........................2017  8:44 AM

## 2018-02-22 ENCOUNTER — TELEPHONE (OUTPATIENT)
Dept: FAMILY MEDICINE | Facility: OTHER | Age: 38
End: 2018-02-22

## 2018-03-25 ENCOUNTER — HEALTH MAINTENANCE LETTER (OUTPATIENT)
Age: 38
End: 2018-03-25

## 2018-07-03 ENCOUNTER — HEALTH MAINTENANCE LETTER (OUTPATIENT)
Age: 38
End: 2018-07-03

## 2020-07-09 ENCOUNTER — OFFICE VISIT (OUTPATIENT)
Dept: FAMILY MEDICINE | Facility: OTHER | Age: 40
End: 2020-07-09
Attending: PHYSICIAN ASSISTANT
Payer: COMMERCIAL

## 2020-07-09 VITALS
BODY MASS INDEX: 22.62 KG/M2 | SYSTOLIC BLOOD PRESSURE: 102 MMHG | WEIGHT: 138 LBS | HEART RATE: 64 BPM | DIASTOLIC BLOOD PRESSURE: 62 MMHG | RESPIRATION RATE: 16 BRPM | TEMPERATURE: 98 F

## 2020-07-09 DIAGNOSIS — L98.9 SKIN LESION: Primary | ICD-10-CM

## 2020-07-09 PROCEDURE — 99213 OFFICE O/P EST LOW 20 MIN: CPT | Performed by: PHYSICIAN ASSISTANT

## 2020-07-09 NOTE — NURSING NOTE
Chief Complaint   Patient presents with     Mole     On collarbone    Medication Reconciliation: complete      Sarita Waldron, LPN

## 2020-07-09 NOTE — PROGRESS NOTES
Nursing Notes:   Sarita Waldron LPN  7/9/2020  1:43 PM  Signed  Chief Complaint   Patient presents with     Mole     On collarbone    Medication Reconciliation: complete      Sarita Waldron LPN      HPI:    Abril Schulz is a 39 year old female who presents for a mole on her collarbone.  She has had a mole on her right medial collarbone for many years.  May have been there as a child.  Recently she has noticed that it is getting a little darker and larger.  Wondering about having it removed.    Past Medical History:   Diagnosis Date     History of cervical dysplasia     2002       Past Surgical History:   Procedure Laterality Date     ARTHROSCOPY KNEE      2007,right     CONIZATION LEEP      2002       Family History   Problem Relation Age of Onset     Family History Negative Father         Good Health     Other - See Comments Mother         MS in remission     Other - See Comments Sister         Endometriosis     Family History Negative Brother         Good Health     Heart Disease Son         Heart Disease, Bicuspid aortic valve     Cancer Maternal Grandmother         Cancer     Cancer Maternal Grandfather         Cancer     Cancer Paternal Grandmother 95        Cancer     Cancer Paternal Grandfather 97        Cancer       Social History     Tobacco Use     Smoking status: Current Every Day Smoker     Packs/day: 1.00     Years: 16.00     Pack years: 16.00     Types: Cigarettes     Smokeless tobacco: Never Used     Tobacco comment: Quit smoking: has been trying   Substance Use Topics     Alcohol use: Yes     Alcohol/week: 0.0 standard drinks     Comment: Alcoholic Drinks/day: occasionally, maybe four per month       No current outpatient medications on file.       No Known Allergies    REVIEW OF SYSTEMS:  Refer to HPI.    EXAM:   Vitals:    /62 (BP Location: Right arm, Patient Position: Sitting, Cuff Size: Adult Regular)   Pulse 64   Temp 98  F (36.7  C)   Resp 16   Wt 62.6 kg  (138 lb)   BMI 22.62 kg/m      General Appearance: Pleasant, alert, appropriate appearance for age. No acute distress  Skin: Approximate 4 x 4 millimeter dark brown, slightly raised papule appreciated on the right medial collarbone region.  No surrounding erythema, open wound, pus, drainage, warmth.  Psychiatric Exam: Alert and oriented - appropriate affect.    PHQ Depression Screen  No flowsheet data found.    ASSESSMENT AND PLAN:      ICD-10-CM    1. Skin lesion  L98.9 GENERAL SURG ADULT REFERRAL         Referred to general surgery for skin consult and probable mole removal.    Gloria Laura PA-C PA-C..................7/9/2020 1:42 PM

## 2020-07-14 ENCOUNTER — OFFICE VISIT (OUTPATIENT)
Dept: SURGERY | Facility: OTHER | Age: 40
End: 2020-07-14
Attending: PHYSICIAN ASSISTANT
Payer: COMMERCIAL

## 2020-07-14 VITALS
BODY MASS INDEX: 22.62 KG/M2 | TEMPERATURE: 97.5 F | SYSTOLIC BLOOD PRESSURE: 118 MMHG | WEIGHT: 138 LBS | DIASTOLIC BLOOD PRESSURE: 72 MMHG | RESPIRATION RATE: 16 BRPM | HEART RATE: 72 BPM

## 2020-07-14 DIAGNOSIS — L98.9 SKIN LESION: Primary | ICD-10-CM

## 2020-07-14 PROCEDURE — 11422 EXC H-F-NK-SP B9+MARG 1.1-2: CPT | Performed by: SURGERY

## 2020-07-14 PROCEDURE — 88305 TISSUE EXAM BY PATHOLOGIST: CPT

## 2020-07-14 ASSESSMENT — PAIN SCALES - GENERAL: PAINLEVEL: NO PAIN (0)

## 2020-07-14 NOTE — NURSING NOTE
"Chief Complaint   Patient presents with     Procedure     upper right chest skin lesion       Initial /72 (BP Location: Right arm, Patient Position: Sitting, Cuff Size: Adult Regular)   Pulse 72   Temp 97.5  F (36.4  C) (Tympanic)   Resp 16   Wt 62.6 kg (138 lb)   LMP 07/13/2020 (Exact Date)   Breastfeeding No   BMI 22.62 kg/m   Estimated body mass index is 22.62 kg/m  as calculated from the following:    Height as of 12/11/17: 1.664 m (5' 5.5\").    Weight as of this encounter: 62.6 kg (138 lb).  Medication Reconciliation: complete    Oxana Serrano LPN    "

## 2020-07-14 NOTE — PROGRESS NOTES
SUBJECTIVE:  39 year old female presents for lesion removal. This lesion has been present for a long time but is getting larger and darker.    OBJECTIVE:  /72 (BP Location: Right arm, Patient Position: Sitting, Cuff Size: Adult Regular)   Pulse 72   Temp 97.5  F (36.4  C) (Tympanic)   Resp 16   Wt 62.6 kg (138 lb)   LMP 07/13/2020 (Exact Date)   Breastfeeding No   BMI 22.62 kg/m    General: no acute distress  Skin: right lower neck above clavicle, 0.6 x 0.5 cm dark brown lesion with regular border, no ulceration    ASSESSMENT:  Lesion size:  As above  Defect size: lesion and margin : 1.2 x 0.9 x 0.3 cm-simple closure    PROCEDURE:  The pathophysiology of skin lesions and skin cancers was discussed with the patient. The risks, benefits and alternatives to excision of the lesion were discussed with the patient, including the risks of infection,scarring, bruising, bleeding and the possible need for further procedures. The patient expressed understanding and wishes to proceed.    Chloraprep was used to cleanse the skinin the area of the lesion. 1% Lidocaine with epinephrine was infiltrated in the skin and subcutaneous tissue in the area of the lesion. When appropriate anesthesia had been achieved, the lesion was sharply excised with a margin of grossly normal tissue. The wound was closed using 4-0 Monocryl for the skin. Sterile dressing was applied. The specimen was labelled and sent to pathology for evaluation. The procedure was well tolerated without complications. Patient was given post procedure instructions and denied further questions. We will call the patient with pathology results.

## 2020-07-14 NOTE — PATIENT INSTRUCTIONS
Your incision was closed with stitches that will dissolve.     It is ok to remove the dressing and get the incision wet in the shower on the day after your procedure.     Don't soak in a tub, pool or lake for 5 days. The small butterfly strips will start to peel off in 5-7 days it is ok to remove them.     We will call you with the pathology results.  If you have concerns, please call.

## 2020-07-14 NOTE — NURSING NOTE
Prior to the start of the procedure and with procedural staff participation, I verbally confirmed the patient s identity using two indicators, relevant allergies, that the procedure was appropriate and matched the consent or emergent situation, and that the correct equipment/implants were available. Immediately prior to starting the procedure I conducted the Time Out with the procedural staff and re-confirmed the patient s name, procedure, and site/side. (The Joint Commission universal protocol was followed.)  Yes    Sedation (Moderate or Deep): None  Oaxna Serrano LPN .......7/14/2020 3:27 PM

## 2021-01-03 ENCOUNTER — HEALTH MAINTENANCE LETTER (OUTPATIENT)
Age: 41
End: 2021-01-03

## 2021-10-09 ENCOUNTER — HEALTH MAINTENANCE LETTER (OUTPATIENT)
Age: 41
End: 2021-10-09

## 2021-11-15 ENCOUNTER — MYC MEDICAL ADVICE (OUTPATIENT)
Dept: FAMILY MEDICINE | Facility: OTHER | Age: 41
End: 2021-11-15
Payer: COMMERCIAL

## 2021-11-15 DIAGNOSIS — F41.9 ANXIETY: Primary | ICD-10-CM

## 2021-11-15 RX ORDER — ALPRAZOLAM 0.5 MG
0.5 TABLET ORAL 2 TIMES DAILY PRN
Qty: 10 TABLET | Refills: 0 | Status: SHIPPED | OUTPATIENT
Start: 2021-11-15 | End: 2023-02-10

## 2022-01-29 ENCOUNTER — HEALTH MAINTENANCE LETTER (OUTPATIENT)
Age: 42
End: 2022-01-29

## 2022-09-17 ENCOUNTER — HEALTH MAINTENANCE LETTER (OUTPATIENT)
Age: 42
End: 2022-09-17

## 2022-11-15 ENCOUNTER — APPOINTMENT (OUTPATIENT)
Dept: CT IMAGING | Facility: OTHER | Age: 42
DRG: 854 | End: 2022-11-15
Attending: FAMILY MEDICINE
Payer: COMMERCIAL

## 2022-11-15 ENCOUNTER — ANESTHESIA (OUTPATIENT)
Dept: SURGERY | Facility: OTHER | Age: 42
DRG: 854 | End: 2022-11-15
Payer: COMMERCIAL

## 2022-11-15 ENCOUNTER — ANESTHESIA EVENT (OUTPATIENT)
Dept: SURGERY | Facility: OTHER | Age: 42
DRG: 854 | End: 2022-11-15
Payer: COMMERCIAL

## 2022-11-15 ENCOUNTER — HOSPITAL ENCOUNTER (INPATIENT)
Facility: OTHER | Age: 42
LOS: 1 days | Discharge: HOME | DRG: 854 | End: 2022-11-17
Attending: FAMILY MEDICINE | Admitting: INTERNAL MEDICINE
Payer: COMMERCIAL

## 2022-11-15 ENCOUNTER — APPOINTMENT (OUTPATIENT)
Dept: GENERAL RADIOLOGY | Facility: OTHER | Age: 42
DRG: 854 | End: 2022-11-15
Attending: UROLOGY
Payer: COMMERCIAL

## 2022-11-15 ENCOUNTER — APPOINTMENT (OUTPATIENT)
Dept: ULTRASOUND IMAGING | Facility: OTHER | Age: 42
DRG: 854 | End: 2022-11-15
Attending: FAMILY MEDICINE
Payer: COMMERCIAL

## 2022-11-15 DIAGNOSIS — Z11.52 ENCOUNTER FOR SCREENING LABORATORY TESTING FOR COVID-19 VIRUS: ICD-10-CM

## 2022-11-15 DIAGNOSIS — N20.1 LEFT URETERAL STONE: ICD-10-CM

## 2022-11-15 DIAGNOSIS — N39.0 URINARY TRACT INFECTION WITHOUT HEMATURIA, SITE UNSPECIFIED: Primary | ICD-10-CM

## 2022-11-15 PROBLEM — N13.30 HYDRONEPHROSIS: Status: ACTIVE | Noted: 2022-11-15

## 2022-11-15 PROBLEM — D72.829 LEUKOCYTOSIS: Status: ACTIVE | Noted: 2022-11-15

## 2022-11-15 LAB
ALBUMIN UR-MCNC: 70 MG/DL
AMORPH CRY #/AREA URNS HPF: ABNORMAL /HPF
ANION GAP SERPL CALCULATED.3IONS-SCNC: 9 MMOL/L (ref 7–15)
APPEARANCE UR: ABNORMAL
BACTERIA #/AREA URNS HPF: ABNORMAL /HPF
BASOPHILS # BLD AUTO: 0 10E3/UL (ref 0–0.2)
BASOPHILS NFR BLD AUTO: 0 %
BILIRUB UR QL STRIP: NEGATIVE
BUN SERPL-MCNC: 15.4 MG/DL (ref 6–20)
CALCIUM SERPL-MCNC: 8.7 MG/DL (ref 8.6–10)
CHLORIDE SERPL-SCNC: 106 MMOL/L (ref 98–107)
COLOR UR AUTO: YELLOW
CREAT SERPL-MCNC: 0.95 MG/DL (ref 0.51–0.95)
CRP SERPL-MCNC: <3 MG/L
DEPRECATED HCO3 PLAS-SCNC: 23 MMOL/L (ref 22–29)
EOSINOPHIL # BLD AUTO: 0 10E3/UL (ref 0–0.7)
EOSINOPHIL NFR BLD AUTO: 0 %
ERYTHROCYTE [DISTWIDTH] IN BLOOD BY AUTOMATED COUNT: 12.1 % (ref 10–15)
FLUAV RNA SPEC QL NAA+PROBE: NEGATIVE
FLUBV RNA RESP QL NAA+PROBE: NEGATIVE
GFR SERPL CREATININE-BSD FRML MDRD: 76 ML/MIN/1.73M2
GLUCOSE SERPL-MCNC: 99 MG/DL (ref 70–99)
GLUCOSE UR STRIP-MCNC: NEGATIVE MG/DL
HCG UR QL: NEGATIVE
HCT VFR BLD AUTO: 38.4 % (ref 35–47)
HGB BLD-MCNC: 13 G/DL (ref 11.7–15.7)
HGB UR QL STRIP: NEGATIVE
HOLD SPECIMEN: NORMAL
IMM GRANULOCYTES # BLD: 0.1 10E3/UL
IMM GRANULOCYTES NFR BLD: 0 %
KETONES UR STRIP-MCNC: 40 MG/DL
LEUKOCYTE ESTERASE UR QL STRIP: ABNORMAL
LYMPHOCYTES # BLD AUTO: 0.8 10E3/UL (ref 0.8–5.3)
LYMPHOCYTES NFR BLD AUTO: 5 %
MCH RBC QN AUTO: 31 PG (ref 26.5–33)
MCHC RBC AUTO-ENTMCNC: 33.9 G/DL (ref 31.5–36.5)
MCV RBC AUTO: 91 FL (ref 78–100)
MONOCYTES # BLD AUTO: 0.7 10E3/UL (ref 0–1.3)
MONOCYTES NFR BLD AUTO: 5 %
MUCOUS THREADS #/AREA URNS LPF: PRESENT /LPF
NEUTROPHILS # BLD AUTO: 14.1 10E3/UL (ref 1.6–8.3)
NEUTROPHILS NFR BLD AUTO: 90 %
NITRATE UR QL: POSITIVE
NRBC # BLD AUTO: 0 10E3/UL
NRBC BLD AUTO-RTO: 0 /100
PH UR STRIP: 8.5 [PH] (ref 5–9)
PLATELET # BLD AUTO: 276 10E3/UL (ref 150–450)
POTASSIUM SERPL-SCNC: 4.7 MMOL/L (ref 3.4–5.3)
RBC # BLD AUTO: 4.2 10E6/UL (ref 3.8–5.2)
RBC URINE: 9 /HPF
RSV RNA SPEC NAA+PROBE: NEGATIVE
SARS-COV-2 RNA RESP QL NAA+PROBE: NEGATIVE
SODIUM SERPL-SCNC: 138 MMOL/L (ref 136–145)
SP GR UR STRIP: 1.03 (ref 1–1.03)
SQUAMOUS EPITHELIAL: 8 /HPF
UROBILINOGEN UR STRIP-MCNC: NORMAL MG/DL
WBC # BLD AUTO: 15.8 10E3/UL (ref 4–11)
WBC URINE: 40 /HPF

## 2022-11-15 PROCEDURE — 81001 URINALYSIS AUTO W/SCOPE: CPT | Performed by: FAMILY MEDICINE

## 2022-11-15 PROCEDURE — 96375 TX/PRO/DX INJ NEW DRUG ADDON: CPT | Performed by: FAMILY MEDICINE

## 2022-11-15 PROCEDURE — 99285 EMERGENCY DEPT VISIT HI MDM: CPT | Performed by: FAMILY MEDICINE

## 2022-11-15 PROCEDURE — 96365 THER/PROPH/DIAG IV INF INIT: CPT | Performed by: FAMILY MEDICINE

## 2022-11-15 PROCEDURE — 87077 CULTURE AEROBIC IDENTIFY: CPT | Performed by: FAMILY MEDICINE

## 2022-11-15 PROCEDURE — 99219 PR INITIAL OBSERVATION CARE,LEVEL II: CPT | Performed by: INTERNAL MEDICINE

## 2022-11-15 PROCEDURE — 87637 SARSCOV2&INF A&B&RSV AMP PRB: CPT | Performed by: FAMILY MEDICINE

## 2022-11-15 PROCEDURE — 76770 US EXAM ABDO BACK WALL COMP: CPT

## 2022-11-15 PROCEDURE — G0378 HOSPITAL OBSERVATION PER HR: HCPCS

## 2022-11-15 PROCEDURE — 74176 CT ABD & PELVIS W/O CONTRAST: CPT

## 2022-11-15 PROCEDURE — C1769 GUIDE WIRE: HCPCS | Performed by: UROLOGY

## 2022-11-15 PROCEDURE — 258N000003 HC RX IP 258 OP 636: Performed by: INTERNAL MEDICINE

## 2022-11-15 PROCEDURE — 99140 ANES COMP EMERGENCY COND: CPT | Performed by: NURSE ANESTHETIST, CERTIFIED REGISTERED

## 2022-11-15 PROCEDURE — C2617 STENT, NON-COR, TEM W/O DEL: HCPCS | Performed by: UROLOGY

## 2022-11-15 PROCEDURE — 710N000010 HC RECOVERY PHASE 1, LEVEL 2, PER MIN: Performed by: UROLOGY

## 2022-11-15 PROCEDURE — 52332 CYSTOSCOPY AND TREATMENT: CPT | Performed by: NURSE ANESTHETIST, CERTIFIED REGISTERED

## 2022-11-15 PROCEDURE — 258N000001 HC RX 258: Performed by: UROLOGY

## 2022-11-15 PROCEDURE — C1758 CATHETER, URETERAL: HCPCS | Performed by: UROLOGY

## 2022-11-15 PROCEDURE — C9803 HOPD COVID-19 SPEC COLLECT: HCPCS | Performed by: FAMILY MEDICINE

## 2022-11-15 PROCEDURE — 96376 TX/PRO/DX INJ SAME DRUG ADON: CPT | Performed by: FAMILY MEDICINE

## 2022-11-15 PROCEDURE — 250N000011 HC RX IP 250 OP 636: Performed by: NURSE ANESTHETIST, CERTIFIED REGISTERED

## 2022-11-15 PROCEDURE — 52332 CYSTOSCOPY AND TREATMENT: CPT | Performed by: UROLOGY

## 2022-11-15 PROCEDURE — 36415 COLL VENOUS BLD VENIPUNCTURE: CPT | Performed by: FAMILY MEDICINE

## 2022-11-15 PROCEDURE — 258N000003 HC RX IP 258 OP 636: Performed by: FAMILY MEDICINE

## 2022-11-15 PROCEDURE — 250N000009 HC RX 250: Performed by: NURSE ANESTHETIST, CERTIFIED REGISTERED

## 2022-11-15 PROCEDURE — 81025 URINE PREGNANCY TEST: CPT | Performed by: FAMILY MEDICINE

## 2022-11-15 PROCEDURE — 99223 1ST HOSP IP/OBS HIGH 75: CPT | Mod: 25 | Performed by: UROLOGY

## 2022-11-15 PROCEDURE — 250N000011 HC RX IP 250 OP 636: Performed by: UROLOGY

## 2022-11-15 PROCEDURE — 99285 EMERGENCY DEPT VISIT HI MDM: CPT | Mod: 25 | Performed by: FAMILY MEDICINE

## 2022-11-15 PROCEDURE — 360N000082 HC SURGERY LEVEL 2 W/ FLUORO, PER MIN: Performed by: UROLOGY

## 2022-11-15 PROCEDURE — 999N000180 XR SURGERY CARM FLUORO LESS THAN 5 MIN: Mod: TC

## 2022-11-15 PROCEDURE — 370N000017 HC ANESTHESIA TECHNICAL FEE, PER MIN: Performed by: UROLOGY

## 2022-11-15 PROCEDURE — 250N000013 HC RX MED GY IP 250 OP 250 PS 637: Performed by: INTERNAL MEDICINE

## 2022-11-15 PROCEDURE — 80048 BASIC METABOLIC PNL TOTAL CA: CPT | Performed by: FAMILY MEDICINE

## 2022-11-15 PROCEDURE — 250N000011 HC RX IP 250 OP 636: Performed by: FAMILY MEDICINE

## 2022-11-15 PROCEDURE — 86140 C-REACTIVE PROTEIN: CPT | Performed by: FAMILY MEDICINE

## 2022-11-15 PROCEDURE — 0T778DZ DILATION OF LEFT URETER WITH INTRALUMINAL DEVICE, VIA NATURAL OR ARTIFICIAL OPENING ENDOSCOPIC: ICD-10-PCS | Performed by: UROLOGY

## 2022-11-15 PROCEDURE — 87186 SC STD MICRODIL/AGAR DIL: CPT | Performed by: FAMILY MEDICINE

## 2022-11-15 PROCEDURE — 74420 UROGRAPHY RTRGR +-KUB: CPT | Mod: 26 | Performed by: UROLOGY

## 2022-11-15 PROCEDURE — 85025 COMPLETE CBC W/AUTO DIFF WBC: CPT | Performed by: FAMILY MEDICINE

## 2022-11-15 PROCEDURE — 87186 SC STD MICRODIL/AGAR DIL: CPT | Performed by: UROLOGY

## 2022-11-15 PROCEDURE — 272N000001 HC OR GENERAL SUPPLY STERILE: Performed by: UROLOGY

## 2022-11-15 PROCEDURE — BT1F1ZZ FLUOROSCOPY OF LEFT KIDNEY, URETER AND BLADDER USING LOW OSMOLAR CONTRAST: ICD-10-PCS | Performed by: UROLOGY

## 2022-11-15 DEVICE — URETERAL STENT
Type: IMPLANTABLE DEVICE | Site: URETER | Status: NON-FUNCTIONAL
Brand: CONTOUR™
Removed: 2022-11-29

## 2022-11-15 RX ORDER — LIDOCAINE HYDROCHLORIDE 20 MG/ML
INJECTION, SOLUTION INFILTRATION; PERINEURAL PRN
Status: DISCONTINUED | OUTPATIENT
Start: 2022-11-15 | End: 2022-11-15

## 2022-11-15 RX ORDER — MULTIVIT-MIN/FOLIC ACID/BIOTIN 200-300MCG
1 TABLET,CHEWABLE ORAL DAILY
COMMUNITY
End: 2024-08-28

## 2022-11-15 RX ORDER — SODIUM CHLORIDE 9 MG/ML
INJECTION, SOLUTION INTRAVENOUS CONTINUOUS
Status: DISCONTINUED | OUTPATIENT
Start: 2022-11-15 | End: 2022-11-15

## 2022-11-15 RX ORDER — OXYCODONE HYDROCHLORIDE 5 MG/1
5 TABLET ORAL EVERY 4 HOURS PRN
Status: DISCONTINUED | OUTPATIENT
Start: 2022-11-15 | End: 2022-11-15 | Stop reason: HOSPADM

## 2022-11-15 RX ORDER — DEXAMETHASONE SODIUM PHOSPHATE 10 MG/ML
8 INJECTION, SOLUTION INTRAMUSCULAR; INTRAVENOUS ONCE
Status: COMPLETED | OUTPATIENT
Start: 2022-11-15 | End: 2022-11-15

## 2022-11-15 RX ORDER — IOPAMIDOL 755 MG/ML
INJECTION, SOLUTION INTRAVASCULAR PRN
Status: DISCONTINUED | OUTPATIENT
Start: 2022-11-15 | End: 2022-11-15

## 2022-11-15 RX ORDER — ONDANSETRON 2 MG/ML
4 INJECTION INTRAMUSCULAR; INTRAVENOUS
Status: COMPLETED | OUTPATIENT
Start: 2022-11-15 | End: 2022-11-15

## 2022-11-15 RX ORDER — POLYETHYLENE GLYCOL 3350 17 G/17G
17 POWDER, FOR SOLUTION ORAL DAILY PRN
Status: DISCONTINUED | OUTPATIENT
Start: 2022-11-15 | End: 2022-11-17 | Stop reason: HOSPADM

## 2022-11-15 RX ORDER — ACETAMINOPHEN 10 MG/ML
INJECTION, SOLUTION INTRAVENOUS PRN
Status: DISCONTINUED | OUTPATIENT
Start: 2022-11-15 | End: 2022-11-15

## 2022-11-15 RX ORDER — HYDROMORPHONE HCL IN WATER/PF 6 MG/30 ML
0.2 PATIENT CONTROLLED ANALGESIA SYRINGE INTRAVENOUS EVERY 5 MIN PRN
Status: DISCONTINUED | OUTPATIENT
Start: 2022-11-15 | End: 2022-11-15 | Stop reason: HOSPADM

## 2022-11-15 RX ORDER — SODIUM CHLORIDE 9 MG/ML
INJECTION, SOLUTION INTRAVENOUS CONTINUOUS
Status: DISCONTINUED | OUTPATIENT
Start: 2022-11-15 | End: 2022-11-17 | Stop reason: HOSPADM

## 2022-11-15 RX ORDER — KETOROLAC TROMETHAMINE 15 MG/ML
15 INJECTION, SOLUTION INTRAMUSCULAR; INTRAVENOUS EVERY 6 HOURS PRN
Status: DISCONTINUED | OUTPATIENT
Start: 2022-11-15 | End: 2022-11-17 | Stop reason: HOSPADM

## 2022-11-15 RX ORDER — NALOXONE HYDROCHLORIDE 0.4 MG/ML
0.2 INJECTION, SOLUTION INTRAMUSCULAR; INTRAVENOUS; SUBCUTANEOUS
Status: DISCONTINUED | OUTPATIENT
Start: 2022-11-15 | End: 2022-11-17 | Stop reason: HOSPADM

## 2022-11-15 RX ORDER — ONDANSETRON 4 MG/1
4 TABLET, ORALLY DISINTEGRATING ORAL EVERY 6 HOURS PRN
Status: DISCONTINUED | OUTPATIENT
Start: 2022-11-15 | End: 2022-11-17 | Stop reason: HOSPADM

## 2022-11-15 RX ORDER — ONDANSETRON 4 MG/1
4 TABLET, ORALLY DISINTEGRATING ORAL EVERY 30 MIN PRN
Status: DISCONTINUED | OUTPATIENT
Start: 2022-11-15 | End: 2022-11-15 | Stop reason: HOSPADM

## 2022-11-15 RX ORDER — NALOXONE HYDROCHLORIDE 0.4 MG/ML
0.4 INJECTION, SOLUTION INTRAMUSCULAR; INTRAVENOUS; SUBCUTANEOUS
Status: DISCONTINUED | OUTPATIENT
Start: 2022-11-15 | End: 2022-11-17 | Stop reason: HOSPADM

## 2022-11-15 RX ORDER — ALPRAZOLAM 0.5 MG
0.5 TABLET ORAL 2 TIMES DAILY PRN
Status: DISCONTINUED | OUTPATIENT
Start: 2022-11-15 | End: 2022-11-17 | Stop reason: HOSPADM

## 2022-11-15 RX ORDER — AMOXICILLIN 250 MG
2 CAPSULE ORAL 2 TIMES DAILY PRN
Status: DISCONTINUED | OUTPATIENT
Start: 2022-11-15 | End: 2022-11-17 | Stop reason: HOSPADM

## 2022-11-15 RX ORDER — BISACODYL 10 MG
10 SUPPOSITORY, RECTAL RECTAL DAILY PRN
Status: DISCONTINUED | OUTPATIENT
Start: 2022-11-15 | End: 2022-11-17 | Stop reason: HOSPADM

## 2022-11-15 RX ORDER — CEFTRIAXONE SODIUM 1 G/50ML
1 INJECTION, SOLUTION INTRAVENOUS EVERY 24 HOURS
Status: DISCONTINUED | OUTPATIENT
Start: 2022-11-16 | End: 2022-11-16 | Stop reason: DRUGHIGH

## 2022-11-15 RX ORDER — MORPHINE SULFATE 2 MG/ML
2 INJECTION, SOLUTION INTRAMUSCULAR; INTRAVENOUS
Status: DISCONTINUED | OUTPATIENT
Start: 2022-11-15 | End: 2022-11-17 | Stop reason: HOSPADM

## 2022-11-15 RX ORDER — KETOROLAC TROMETHAMINE 30 MG/ML
30 INJECTION, SOLUTION INTRAMUSCULAR; INTRAVENOUS ONCE
Status: COMPLETED | OUTPATIENT
Start: 2022-11-15 | End: 2022-11-15

## 2022-11-15 RX ORDER — ONDANSETRON 2 MG/ML
4 INJECTION INTRAMUSCULAR; INTRAVENOUS EVERY 6 HOURS PRN
Status: DISCONTINUED | OUTPATIENT
Start: 2022-11-15 | End: 2022-11-17 | Stop reason: HOSPADM

## 2022-11-15 RX ORDER — SCOLOPAMINE TRANSDERMAL SYSTEM 1 MG/1
1 PATCH, EXTENDED RELEASE TRANSDERMAL
Status: DISCONTINUED | OUTPATIENT
Start: 2022-11-15 | End: 2022-11-17 | Stop reason: HOSPADM

## 2022-11-15 RX ORDER — ONDANSETRON 2 MG/ML
4 INJECTION INTRAMUSCULAR; INTRAVENOUS EVERY 30 MIN PRN
Status: DISCONTINUED | OUTPATIENT
Start: 2022-11-15 | End: 2022-11-15 | Stop reason: HOSPADM

## 2022-11-15 RX ORDER — METOCLOPRAMIDE HYDROCHLORIDE 5 MG/ML
10 INJECTION INTRAMUSCULAR; INTRAVENOUS
Status: DISCONTINUED | OUTPATIENT
Start: 2022-11-15 | End: 2022-11-15

## 2022-11-15 RX ORDER — ONDANSETRON 2 MG/ML
4 INJECTION INTRAMUSCULAR; INTRAVENOUS EVERY 30 MIN PRN
Status: CANCELLED | OUTPATIENT
Start: 2022-11-15

## 2022-11-15 RX ORDER — SODIUM CHLORIDE 9 MG/ML
INJECTION, SOLUTION INTRAVENOUS CONTINUOUS
Status: DISCONTINUED | OUTPATIENT
Start: 2022-11-15 | End: 2022-11-15 | Stop reason: HOSPADM

## 2022-11-15 RX ORDER — FENTANYL CITRATE 50 UG/ML
50 INJECTION, SOLUTION INTRAMUSCULAR; INTRAVENOUS EVERY 5 MIN PRN
Status: DISCONTINUED | OUTPATIENT
Start: 2022-11-15 | End: 2022-11-15 | Stop reason: HOSPADM

## 2022-11-15 RX ORDER — HYDROMORPHONE HYDROCHLORIDE 1 MG/ML
0.5 INJECTION, SOLUTION INTRAMUSCULAR; INTRAVENOUS; SUBCUTANEOUS EVERY 30 MIN PRN
Status: CANCELLED | OUTPATIENT
Start: 2022-11-15

## 2022-11-15 RX ORDER — LIDOCAINE 40 MG/G
CREAM TOPICAL
Status: CANCELLED | OUTPATIENT
Start: 2022-11-15

## 2022-11-15 RX ORDER — SODIUM CHLORIDE 9 MG/ML
INJECTION, SOLUTION INTRAVENOUS CONTINUOUS
Status: CANCELLED | OUTPATIENT
Start: 2022-11-15

## 2022-11-15 RX ORDER — AMOXICILLIN 250 MG
1 CAPSULE ORAL 2 TIMES DAILY PRN
Status: DISCONTINUED | OUTPATIENT
Start: 2022-11-15 | End: 2022-11-17 | Stop reason: HOSPADM

## 2022-11-15 RX ORDER — PROPOFOL 10 MG/ML
INJECTION, EMULSION INTRAVENOUS PRN
Status: DISCONTINUED | OUTPATIENT
Start: 2022-11-15 | End: 2022-11-15

## 2022-11-15 RX ORDER — ACETAMINOPHEN 325 MG/1
975 TABLET ORAL EVERY 6 HOURS PRN
Status: DISCONTINUED | OUTPATIENT
Start: 2022-11-15 | End: 2022-11-17 | Stop reason: HOSPADM

## 2022-11-15 RX ORDER — PROPOFOL 10 MG/ML
INJECTION, EMULSION INTRAVENOUS CONTINUOUS PRN
Status: DISCONTINUED | OUTPATIENT
Start: 2022-11-15 | End: 2022-11-15

## 2022-11-15 RX ORDER — LIDOCAINE 40 MG/G
CREAM TOPICAL
Status: DISCONTINUED | OUTPATIENT
Start: 2022-11-15 | End: 2022-11-17 | Stop reason: HOSPADM

## 2022-11-15 RX ORDER — CEFTRIAXONE SODIUM 1 G/50ML
1 INJECTION, SOLUTION INTRAVENOUS ONCE
Status: COMPLETED | OUTPATIENT
Start: 2022-11-15 | End: 2022-11-15

## 2022-11-15 RX ADMIN — HYDROMORPHONE HYDROCHLORIDE 0.5 MG: 1 INJECTION, SOLUTION INTRAMUSCULAR; INTRAVENOUS; SUBCUTANEOUS at 12:56

## 2022-11-15 RX ADMIN — DEXAMETHASONE SODIUM PHOSPHATE 8 MG: 10 INJECTION, SOLUTION INTRAMUSCULAR; INTRAVENOUS at 15:00

## 2022-11-15 RX ADMIN — SODIUM CHLORIDE: 9 INJECTION, SOLUTION INTRAVENOUS at 16:55

## 2022-11-15 RX ADMIN — HYDROMORPHONE HYDROCHLORIDE 1 MG: 1 INJECTION, SOLUTION INTRAMUSCULAR; INTRAVENOUS; SUBCUTANEOUS at 13:54

## 2022-11-15 RX ADMIN — PROCHLORPERAZINE EDISYLATE 5 MG: 5 INJECTION INTRAMUSCULAR; INTRAVENOUS at 14:59

## 2022-11-15 RX ADMIN — ACETAMINOPHEN 975 MG: 325 TABLET ORAL at 23:32

## 2022-11-15 RX ADMIN — LIDOCAINE HYDROCHLORIDE 20 MG: 20 INJECTION, SOLUTION INFILTRATION; PERINEURAL at 15:12

## 2022-11-15 RX ADMIN — MIDAZOLAM HYDROCHLORIDE 2 MG: 1 INJECTION, SOLUTION INTRAMUSCULAR; INTRAVENOUS at 15:12

## 2022-11-15 RX ADMIN — Medication: at 16:54

## 2022-11-15 RX ADMIN — PROPOFOL 50 MG: 10 INJECTION, EMULSION INTRAVENOUS at 15:12

## 2022-11-15 RX ADMIN — SODIUM CHLORIDE: 9 INJECTION, SOLUTION INTRAVENOUS at 20:58

## 2022-11-15 RX ADMIN — SCOPALAMINE 1 PATCH: 1 PATCH, EXTENDED RELEASE TRANSDERMAL at 14:56

## 2022-11-15 RX ADMIN — PROPOFOL 140 MCG/KG/MIN: 10 INJECTION, EMULSION INTRAVENOUS at 15:12

## 2022-11-15 RX ADMIN — KETOROLAC TROMETHAMINE 30 MG: 30 INJECTION, SOLUTION INTRAMUSCULAR at 10:39

## 2022-11-15 RX ADMIN — ONDANSETRON 4 MG: 2 INJECTION INTRAMUSCULAR; INTRAVENOUS at 12:00

## 2022-11-15 RX ADMIN — CEFTRIAXONE SODIUM 1 G: 1 INJECTION, SOLUTION INTRAVENOUS at 14:17

## 2022-11-15 RX ADMIN — ONDANSETRON 4 MG: 2 INJECTION INTRAMUSCULAR; INTRAVENOUS at 10:39

## 2022-11-15 RX ADMIN — ACETAMINOPHEN 1000 MG: 10 INJECTION, SOLUTION INTRAVENOUS at 15:15

## 2022-11-15 RX ADMIN — Medication: at 14:16

## 2022-11-15 RX ADMIN — HYDROMORPHONE HYDROCHLORIDE 0.5 MG: 1 INJECTION, SOLUTION INTRAMUSCULAR; INTRAVENOUS; SUBCUTANEOUS at 11:57

## 2022-11-15 ASSESSMENT — LIFESTYLE VARIABLES: TOBACCO_USE: 1

## 2022-11-15 ASSESSMENT — ACTIVITIES OF DAILY LIVING (ADL)
ADLS_ACUITY_SCORE: 31
ADLS_ACUITY_SCORE: 35
ADLS_ACUITY_SCORE: 31
ADLS_ACUITY_SCORE: 35
ADLS_ACUITY_SCORE: 31
ADLS_ACUITY_SCORE: 31
ADLS_ACUITY_SCORE: 35

## 2022-11-15 ASSESSMENT — ENCOUNTER SYMPTOMS
ABDOMINAL PAIN: 1
FEVER: 0
FLANK PAIN: 1

## 2022-11-15 NOTE — ANESTHESIA POSTPROCEDURE EVALUATION
Patient: Abril Schulz    Procedure: Procedure(s):  Left retrograde pyelogram and stent placement       Anesthesia Type:  MAC    Note:  Disposition: Outpatient   Postop Pain Control: Uneventful            Sign Out: Well controlled pain   PONV: No   Neuro/Psych: Uneventful            Sign Out: Acceptable/Baseline neuro status   Airway/Respiratory: Uneventful            Sign Out: Acceptable/Baseline resp. status   CV/Hemodynamics: Uneventful            Sign Out: Acceptable CV status   Other NRE: NONE   DID A NON-ROUTINE EVENT OCCUR? No           Last vitals:  Vitals Value Taken Time   BP 90/54 11/15/22 1555   Temp 97.7  F (36.5  C) 11/15/22 1550   Pulse 69 11/15/22 1555   Resp 14 11/15/22 1535   SpO2 97 % 11/15/22 1556   Vitals shown include unvalidated device data.    Electronically Signed By: DELORES RUIZ CRNA  November 15, 2022  3:57 PM

## 2022-11-15 NOTE — OR NURSING
PACU Transfer Note    Abril Schulz was transferred to [facility] via bed and report given to Pilar Ely RN.  Equipment used for transport:  none.  Accompanied by:  PACU RN staff  Prescriptions were: none    PACU Respiratory Event Documentation     1) Episodes of Apnea greater than or equal to 10 seconds: 0    2) Bradypnea - less than 8 breaths per minute: 0    3) Pain score on 0 to 10 scale: 0    4) Pain-sedation mismatch (yes or no): no    5) Repeated 02 desaturation less than 90% (yes or no): no    Anesthesia notified? (yes or no): NA    Any of the above events occuring repeatedly in separate 30 minute intervals may be considered recurrent PACU respiratory events.    Patient stable and meets phase 1 discharge criteria for transport from PACU.

## 2022-11-15 NOTE — ANESTHESIA CARE TRANSFER NOTE
Patient: Abril Schulz    Procedure: Procedure(s):  Left retrograde pyelogram and stent placement       Diagnosis: Left ureteral stone [N20.1]  Urinary tract infection without hematuria, site unspecified [N39.0]  Diagnosis Additional Information: No value filed.    Anesthesia Type:   MAC     Note:    Oropharynx: oropharynx clear of all foreign objects  Level of Consciousness: awake and drowsy  Oxygen Supplementation: nasal cannula  Level of Supplemental Oxygen (L/min / FiO2): 2  Independent Airway: airway patency satisfactory and stable    Vital Signs Stable: post-procedure vital signs reviewed and stable  Report to RN Given: handoff report given  Patient transferred to: PACU    Handoff Report: Identifed the Patient, Identified the Reponsible Provider, Reviewed the pertinent medical history, Discussed the surgical course, Reviewed Intra-OP anesthesia mangement and issues during anesthesia, Set expectations for post-procedure period and Allowed opportunity for questions and acknowledgement of understanding      Vitals:  Vitals Value Taken Time   BP 88/53 11/15/22 1550   Temp 97.7  F (36.5  C) 11/15/22 1550   Pulse 65 11/15/22 1550   Resp 14 11/15/22 1535   SpO2 96 % 11/15/22 1555   Vitals shown include unvalidated device data.    Electronically Signed By: DELORES RUIZ CRNA  November 15, 2022  3:56 PM

## 2022-11-15 NOTE — CONSULTS
I was asked to see this patient by Dr Frode and provide my opinion about the following:  Ureteral stone with pyelonephritis    Type of Visit  Consult    Chief Complaint  Ureteral stone with pyelonephritis    HPI  Ms. Schulz is a 42 year old female who presents with symptoms of pyelonephritis.   The patient initially presented to the ED earlier today with sever left flank pain.  Patient underwent CT scan revealing an obstructing left ureteral stone  Labs and clinical symptoms are concerning for pyelonephritis.  Patient is having subjective fevers, nausea and vomiting.  The patient has not undergone surgery in the past for stones.    Pain ROS  Location:  Left flank  Quality:    Sharp  Provocative factors:  Nothing makes it worse  Palliative factors:   Narcotic meds make it better  Radiation:   None  Severity:   10/10 currently and 10/10 at its worst  Time:     Pain started 1 days ago      Past Medical History  She  has a past medical history of History of cervical dysplasia.  Patient Active Problem List   Diagnosis     Depo-Provera contraceptive status     Generalized hyperhidrosis     Tobacco abuse     Left ureteral stone     Hydronephrosis     Urinary tract infection     Leukocytosis     Past Surgical History  She  has a past surgical history that includes Conization leep and Arthroscopy knee.    Medications    Current Facility-Administered Medications:      HYDROmorphone (DILAUDID) injection 1 mg, 1 mg, Intravenous, Q30 Min PRN, Deshawn Vallejo MD, 1 mg at 11/15/22 1354     metoclopramide (REGLAN) injection 10 mg, 10 mg, Intravenous, Once PRN, Deshawn Vallejo MD     prochlorperazine (COMPAZINE) injection 5 mg, 5 mg, Intravenous, Once PRN, Deshawn Vallejo MD     sodium chloride 0.9% infusion, , Intravenous, Continuous, Deshawn Vallejo MD, Last Rate: 125 mL/hr at 11/15/22 1416, New Bag at 11/15/22 1416    Current Outpatient Medications:      ALPRAZolam (XANAX) 0.5 MG tablet, Take 1  tablet (0.5 mg) by mouth 2 times daily as needed for anxiety, Disp: 10 tablet, Rfl: 0    Allergies  No Known Allergies    Social History  She  reports that she has been smoking cigarettes. She has a 16.00 pack-year smoking history. She has never used smokeless tobacco. She reports current alcohol use.  No drug abuse.    Family History  Family History   Problem Relation Age of Onset     Family History Negative Father         Good Health     Other - See Comments Mother         MS in remission     Other - See Comments Sister         Endometriosis     Family History Negative Brother         Good Health     Heart Disease Son         Heart Disease, Bicuspid aortic valve     Cancer Maternal Grandmother         Cancer     Cancer Maternal Grandfather         Cancer     Cancer Paternal Grandmother 95        Cancer     Cancer Paternal Grandfather 97        Cancer     Review of Systems  I personally reviewed the ROS with the patient.    Unintentional weight loss:  No  Recent fever/chills: Yes  Night sweats: No   Current skin rash: No   Recent hair loss: No   Heat intolerance: No   Cold intolerance: No   Chest pain: No   Palpitations: No   Shortness of breath: No  Wheezing: No   Constipation: No   Diarrhea: No   Nausea: Yes    Vomiting: Yes   Kidney/side pain: Yes   Back pain: No  Frequent headaches: No  Dizziness: No   Leg swelling: No   Calf pain: No    Physical Exam  Vitals:    11/15/22 1245 11/15/22 1354 11/15/22 1355 11/15/22 1356   BP: 101/61      Pulse: 67      Resp:       Temp:       SpO2:  98% 100% 100%   Weight:       Height:       Constitutional: Uncomfortable writhing in pain.  Alert and cooperative   Cardiovascular: Regular rate.  Pulmonary/Chest: Respirations are even and non-labored bilaterally, no audible wheezing  Abdominal: Soft. No distension, tenderness, masses or guarding.   Extremities: DONNY x 4, Warm. No clubbing.  No cyanosis.    Skin: Pink, warm and dry.  No visible rashes noted.  Psychiatric:  Normal  mood and affect  Back:  ++ left CVA tenderness.  - right CVA tenderness.  Genitourinary: nonpalpable bladder    Labs  Results for orders placed or performed during the hospital encounter of 11/15/22   US Renal Complete Non-Vascular     Status: None    Narrative    PROCEDURE: US RENAL COMPLETE NON-VASCULAR 11/15/2022 12:34 PM    HISTORY: left sided flank pain, likely ureteral stone    COMPARISONS: None.    TECHNIQUE: Routine renal ultrasound.    FINDINGS: Kidneys are normal in size. Right kidney measures 11.7 cm in  length and the left 11.6 cm in length. There is no renal mass. No  definite renal stone is seen.    The left renal pelvis is mildly dilated when compared to the right but  there is not significant dilatation of the calyceal system. Renal  pelvis measures about 1.9 cm.         Impression    IMPRESSION: Mildly prominent left renal pelvis without significant  dilatation of the calyces.    VIOLETA PAYNE MD         SYSTEM ID:  [id number]   CT Abdomen Pelvis w/o Contrast     Status: None    Narrative    Exam:CT ABDOMEN PELVIS W/O CONTRAST    History:  42 years Female left ureteral stone with infection    Comparison:None    Technique: Axial CT imaging of the abdomen and pelvis was performed  without contrast. Coronal and sagittal reconstructions were obtained.    This exam was performed using one or more of the following dose  reduction techniques:  Automated exposure control, adjustment of the LESTER and/or KV according  to patient's size, and/or use of iterative reconstruction technique.    Findings:      Lung bases:The lung bases are clear.        Kidneys: There is left-sided hydronephrosis and perinephric edema.  There is an obstructing stone at the ureteropelvic junction measuring  7 x 6 x 9 mm. There is a 3 mm nonobstructing stone at the lower pole  of the left kidney.       Abdomen: Evaluation is limited due to lack of intravenous contrast.  Unenhanced images of the liver spleen pancreas and adrenal glands  are  unremarkable.  No abnormally distended or thickened loops of bowel are present.         Pelvis:There is no mass or lymphadenopathy. No abnormal fluid  collections are present.                Impression    Impression: There is a proximal obstructing left ureteral calculus at  the ureteropelvic junction. The calculus measures 7 x 6 x 9 mm. There  is hydronephrosis and perinephric edema.    NAHUM MARTIN MD         SYSTEM ID:  [id number]   UA with Microscopic reflex to Culture     Status: Abnormal    Specimen: Urine, Midstream   Result Value Ref Range    Color Urine Yellow Colorless, Straw, Light Yellow, Yellow    Appearance Urine Cloudy (A) Clear    Glucose Urine Negative Negative mg/dL    Bilirubin Urine Negative Negative    Ketones Urine 40 (A) Negative mg/dL    Specific Gravity Urine 1.030 1.000 - 1.030    Blood Urine Negative Negative    pH Urine 8.5 5.0 - 9.0    Protein Albumin Urine 70 (A) Negative mg/dL    Urobilinogen Urine Normal Normal, 2.0 mg/dL    Nitrite Urine Positive (A) Negative    Leukocyte Esterase Urine Moderate (A) Negative    Bacteria Urine Many (A) None Seen /HPF    Mucus Urine Present (A) None Seen /LPF    Amorphous Crystals Urine Few (A) None Seen /HPF    RBC Urine 9 (H) <=2 /HPF    WBC Urine 40 (H) <=5 /HPF    Squamous Epithelials Urine 8 (H) <=1 /HPF    Narrative    Urine Culture ordered based on laboratory criteria   Basic metabolic panel     Status: Normal   Result Value Ref Range    Sodium 138 136 - 145 mmol/L    Potassium 4.7 3.4 - 5.3 mmol/L    Chloride 106 98 - 107 mmol/L    Carbon Dioxide (CO2) 23 22 - 29 mmol/L    Anion Gap 9 7 - 15 mmol/L    Urea Nitrogen 15.4 6.0 - 20.0 mg/dL    Creatinine 0.95 0.51 - 0.95 mg/dL    Calcium 8.7 8.6 - 10.0 mg/dL    Glucose 99 70 - 99 mg/dL    GFR Estimate 76 >60 mL/min/1.73m2   CRP inflammation     Status: Normal   Result Value Ref Range    CRP Inflammation <3.00 <5.00 mg/L   CBC with platelets and differential     Status: Abnormal   Result  Value Ref Range    WBC Count 15.8 (H) 4.0 - 11.0 10e3/uL    RBC Count 4.20 3.80 - 5.20 10e6/uL    Hemoglobin 13.0 11.7 - 15.7 g/dL    Hematocrit 38.4 35.0 - 47.0 %    MCV 91 78 - 100 fL    MCH 31.0 26.5 - 33.0 pg    MCHC 33.9 31.5 - 36.5 g/dL    RDW 12.1 10.0 - 15.0 %    Platelet Count 276 150 - 450 10e3/uL    % Neutrophils 90 %    % Lymphocytes 5 %    % Monocytes 5 %    % Eosinophils 0 %    % Basophils 0 %    % Immature Granulocytes 0 %    NRBCs per 100 WBC 0 <1 /100    Absolute Neutrophils 14.1 (H) 1.6 - 8.3 10e3/uL    Absolute Lymphocytes 0.8 0.8 - 5.3 10e3/uL    Absolute Monocytes 0.7 0.0 - 1.3 10e3/uL    Absolute Eosinophils 0.0 0.0 - 0.7 10e3/uL    Absolute Basophils 0.0 0.0 - 0.2 10e3/uL    Absolute Immature Granulocytes 0.1 <=0.4 10e3/uL    Absolute NRBCs 0.0 10e3/uL   Extra Tube     Status: None    Narrative    The following orders were created for panel order Extra Tube.  Procedure                               Abnormality         Status                     ---------                               -----------         ------                     Extra Blue Top Tube[[id number]]                              Final result               Extra Serum Separator Tu...[[id number]]                      Final result               Extra Green Top (Lithium...[[id number]]                      Final result                 Please view results for these tests on the individual orders.   Extra Blue Top Tube     Status: None   Result Value Ref Range    Hold Specimen JIC    Extra Serum Separator Tube (SST)     Status: None   Result Value Ref Range    Hold Specimen JIC    Extra Green Top (Lithium Heparin) ON ICE     Status: None   Result Value Ref Range    Hold Specimen JIC    HCG qualitative urine (UPT)     Status: Normal   Result Value Ref Range    hCG Urine Qualitative Negative Negative   Extra Tube     Status: None ()    Narrative    The following orders were created for panel order Extra Tube.  Procedure                                Abnormality         Status                     ---------                               -----------         ------                     Extra Green Top (Lithium...[[id number]]                                                   Please view results for these tests on the individual orders.   CBC with platelets differential     Status: Abnormal    Narrative    The following orders were created for panel order CBC with platelets differential.  Procedure                               Abnormality         Status                     ---------                               -----------         ------                     CBC with platelets and d...[[id number]]  Abnormal            Final result                 Please view results for these tests on the individual orders.     Imaging  I personally reviewed and interpreted the images and report.  CT a/p   11/15/2022  Impression: There is a proximal obstructing left ureteral calculus at  the ureteropelvic junction. The calculus measures 7 x 6 x 9 mm. There  is hydronephrosis and perinephric edema.    Assessment  Ms. Schulz is a 42 year old female who presents with obstructing left ureteral stone in the presence of clinical infection of the urinary tract.  Reviewed the past notes, labs and imaging to conduct visit.  Discussed case with Dr Vallejo, ED provider.    Explained that this situation needs to be managed urgently due to risks of progression of the infection.  Explained that options include stents and/or nephrostomy tubes.  I recommend urgent placement of a ureteral stent    Plan  Urgent placement of a left ureteral stent in the OR under MAC sedation.  The stone will be treated definitively on a delayed timeframe after culture appropriate antibiotics have been administered.

## 2022-11-15 NOTE — ANESTHESIA PREPROCEDURE EVALUATION
Anesthesia Pre-Procedure Evaluation    Patient: Abril Schulz   MRN: [id number] : 1980        Procedure : Procedure(s):  Left retrograde pyelogram and stent placement          Past Medical History:   Diagnosis Date     History of cervical dysplasia           Past Surgical History:   Procedure Laterality Date     ARTHROSCOPY KNEE      ,right     CONIZATION LEEP      2002      No Known Allergies   Social History     Tobacco Use     Smoking status: Every Day     Packs/day: 1.00     Years: 16.00     Pack years: 16.00     Types: Cigarettes     Smokeless tobacco: Never     Tobacco comments:     Quit smoking: has been trying   Substance Use Topics     Alcohol use: Yes     Alcohol/week: 0.0 standard drinks     Comment: Alcoholic Drinks/day: occasionally, maybe four per month      Wt Readings from Last 1 Encounters:   11/15/22 61.2 kg (135 lb)        Anesthesia Evaluation   Pt has had prior anesthetic.     History of anesthetic complications  - PONV.      ROS/MED HX  ENT/Pulmonary:     (+) tobacco use, Current use,     Neurologic:  - neg neurologic ROS     Cardiovascular:  - neg cardiovascular ROS     METS/Exercise Tolerance: >4 METS    Hematologic:  - neg hematologic  ROS     Musculoskeletal:  - neg musculoskeletal ROS     GI/Hepatic:  - neg GI/hepatic ROS     Renal/Genitourinary:     (+) renal disease, Nephrolithiasis ,     Endo:  - neg endo ROS     Psychiatric/Substance Use:  - neg psychiatric ROS     Infectious Disease:     (+) Recent Fever,     Malignancy:  - neg malignancy ROS     Other:            Physical Exam    Airway        Mallampati: II   TM distance: > 3 FB   Neck ROM: full   Mouth opening: > 3 cm    Respiratory Devices and Support         Dental  no notable dental history         Cardiovascular   cardiovascular exam normal          Pulmonary   pulmonary exam normal                OUTSIDE LABS:  CBC:   Lab Results   Component Value Date    WBC 15.8 (H) 11/15/2022    HGB 13.0 11/15/2022     HGB 15.0 07/03/2013    HCT 38.4 11/15/2022    HCT 43.6 07/03/2013     11/15/2022     07/03/2013     BMP:   Lab Results   Component Value Date     11/15/2022    POTASSIUM 4.7 11/15/2022    CHLORIDE 106 11/15/2022    CO2 23 11/15/2022    BUN 15.4 11/15/2022    CR 0.95 11/15/2022    GLC 99 11/15/2022     COAGS: No results found for: PTT, INR, FIBR  POC:   Lab Results   Component Value Date    HCG Negative 11/15/2022     HEPATIC: No results found for: ALBUMIN, PROTTOTAL, ALT, AST, GGT, ALKPHOS, BILITOTAL, BILIDIRECT, ERICA  OTHER:   Lab Results   Component Value Date    ANH 8.7 11/15/2022    CRP <3.00 11/15/2022       Anesthesia Plan    ASA Status:  2, emergent    NPO Status:  NPO Appropriate    Anesthesia Type: MAC.     - Reason for MAC: straight local not clinically adequate              Consents    Anesthesia Plan(s) and associated risks, benefits, and realistic alternatives discussed. Questions answered and patient/representative(s) expressed understanding.     - Discussed: Risks, Benefits and Alternatives for BOTH SEDATION and the PROCEDURE were discussed     - Discussed with:  Patient      - Extended Intubation/Ventilatory Support Discussed: No.      - Patient is DNR/DNI Status: No    Use of blood products discussed: No .     Postoperative Care    Pain management: IV analgesics, Multi-modal analgesia.   PONV prophylaxis: Ondansetron (or other 5HT-3), Dexamethasone or Solumedrol, Scopolamine patch     Comments:                DELORES RUIZ CRNA

## 2022-11-15 NOTE — PROGRESS NOTES
Transfer into [facility][id number] via medical bed, accompanied by recovery RN's Arabella and Deb, report received from Abril SANTIAGO, patient settled into room, denies any pain or discomfort, BP's soft, updated Dr. Forde with orders received and noted.

## 2022-11-15 NOTE — OP NOTE
Preoperative diagnosis  Left UPJ stone  Pyelonephritis    Postoperative diagnosis  Left UPJ stone  Pyelonephritis    Procedure performed  Cystoscopy with left retrograde pyelogram and ureteral stent placement, 6 x 26  Interpretation of retrograde    Surgeon and Assistants (if any)  Surgeon(s):  Samir Phillips MD  Circulator: Marysol Starkey RN  Scrub Person: Sally Lainez    Specimen(s)  Yes   UCx    (EBL) Estimated blood loss (ml)  0    Anesthesia  General    Complications  None    Findings  Cystoscopy revealed no tumors, stones or other mucosal abnormalities.  Left retrograde pyelogram revealed a moderately dilated system with no filling defects.    Indications  42 year old female agreed to undergo the above named procedure after discussion of the alternatives, risks and benefits.  Informed consent was obtained.      Procedure  The patient was taken to the operating room and placed supine on the operating table.  Pre-operative antibiotics were administered.  Bilateral lower extremity SCDs were placed.  After induction of general anesthesia the patient was positioned in dorsal lithotomy, prepped and draped in a sterile fashion.  A time-out was performed.      I passed a [id number] Surinamese flexible cystoscope via urethra into the bladder.  A Sensor wire was passed retrograde through the left ureteral orifice.  A 5-Surinamese open-ended catheter was passed over the wire.  A retrograde pyelogram was performed by slowly injecting 3mL of Omnipaque contrast via the 5 Surinamese catheter with findings described above. A superstiff wire was then placed into the upper pole and a 6 x 26 Surinamese ureteral stent was placed in retrograde fashion under fluoroscopic guidance with good coil confirmed to be in the upper pole of the kidney and in the bladder. The patient tolerated the procedure well.

## 2022-11-15 NOTE — H&P
[facility]    History and Physical - Hospitalist Service       Date of Admission:  11/15/2022    Assessment & Plan      Abril Schulz is a 42 year old female admitted on 11/15/2022. She has left flank pain and found to have a left urteral stone.     Principal Problem:    Left ureteral stone  Assessment: postoperative day # 0 stent placement. Pain improved.   Plan: anticipate home tomorrow.     Active Problems:    Hydronephrosis  Assessment: present on admission, secondary to ureteral stone      Urinary tract infection  Assessment: present on admission, urine culture pending  Plan: empiric ceftriaxone, monitor tonight, home on oral antibiotics tomorrow.      Leukocytosis  Assessment: present on admission, secondary to urinary tract infection   Plan: check in AM     Diet: NPO for Medical/Clinical Reasons Except for: No Exceptions    DVT Prophylaxis: Low Risk/Ambulatory with no VTE prophylaxis indicated  Willard Catheter: Not present  Central Lines: None  Cardiac Monitoring: None  Code Status:   Full    Disposition Plan      Expected Discharge Date: 11/16/2022                The patient's care was discussed with the Patient.    Taye Forde MD  Hospitalist Service  [facility]  Securely message with the Vocera Web Console (learn more here)  Text page via [facility] Paging/Directory         ______________________________________________________________________    Chief Complaint   Left flank pain    History is obtained from the patient    History of Present Illness   Abril Schulz is a 42 year old female who presents to the emergency department with the acute onset of left-sided flank pain that started approximately 3 hours prior to arrival.  The pain radiated to the left lower quadrant of her abdomen and she presented to the ER.  She has a history of nephrolithiasis in the past.    Work-up in the emergency department shows she is hemodynamically stable.  She has  leukocytosis and imaging shows a 7 mm left ureteral stone with hydronephrosis.  Urinalysis shows pyuria and bacteriuria.  She was brought to the OR for a ureteral stent by Dr. Phillips and I see her upon arrival to the medical floor.  She is currently much more comfortable and has no complaints.    Review of Systems    CONSTITUTIONAL: NEGATIVE for fever, chills, change in weight  INTEGUMENTARY/SKIN: NEGATIVE for worrisome rashes, moles or lesions  EYES: NEGATIVE for vision changes or irritation  ENT/MOUTH: NEGATIVE for ear, mouth and throat problems  RESP: NEGATIVE for significant cough or SOB  CV: NEGATIVE for chest pain, palpitations or peripheral edema  GI: NEGATIVE for nausea, abdominal pain, heartburn, or change in bowel habits  : NEGATIVE for frequency, dysuria, or hematuria  MUSCULOSKELETAL: NEGATIVE for significant arthralgias or myalgia  NEURO: NEGATIVE for weakness, dizziness or paresthesias  ENDOCRINE: NEGATIVE for temperature intolerance, skin/hair changes  HEME: NEGATIVE for bleeding problems  PSYCHIATRIC: NEGATIVE for changes in mood or affect    Past Medical History    I have reviewed this patient's medical history and updated it with pertinent information if needed.   Past Medical History:   Diagnosis Date     History of cervical dysplasia     2002       Past Surgical History   I have reviewed this patient's surgical history and updated it with pertinent information if needed.  Past Surgical History:   Procedure Laterality Date     ARTHROSCOPY KNEE      2007,right     CONIZATION LEEP      2002       Social History   I have reviewed this patient's social history and updated it with pertinent information if needed.  Social History     Tobacco Use     Smoking status: Every Day     Packs/day: 1.00     Years: 16.00     Pack years: 16.00     Types: Cigarettes     Smokeless tobacco: Never     Tobacco comments:     Quit smoking: has been trying   Substance Use Topics     Alcohol use: Yes     Alcohol/week: 0.0  standard drinks     Comment: Alcoholic Drinks/day: occasionally, maybe four per month     Drug use: Unknown     Types: Other     Comment: Drug use: No       Family History   I have reviewed this patient's family history and updated it with pertinent information if needed.  Family History   Problem Relation Age of Onset     Family History Negative Father         Good Health     Other - See Comments Mother         MS in remission     Other - See Comments Sister         Endometriosis     Family History Negative Brother         Good Health     Heart Disease Son         Heart Disease, Bicuspid aortic valve     Cancer Maternal Grandmother         Cancer     Cancer Maternal Grandfather         Cancer     Cancer Paternal Grandmother 95        Cancer     Cancer Paternal Grandfather 97        Cancer       Prior to Admission Medications   Prior to Admission Medications   Prescriptions Last Dose Informant Patient Reported? Taking?   ALPRAZolam (XANAX) 0.5 MG tablet   No No   Sig: Take 1 tablet (0.5 mg) by mouth 2 times daily as needed for anxiety      Facility-Administered Medications: None     Allergies   No Known Allergies    Physical Exam   Vital Signs: Temp: 98.2  F (36.8  C)   BP: 101/61 Pulse: 67   Resp: 16 SpO2: 100 %      Weight: 135 lbs 0 oz    GENERAL: Comfortable, talkative, in no apparent distress.  HEENT: Anicteric, non-injected sclera, mouth moist.   NECK: No JVD.  CARDIOVASCULAR: regular rate and rhythm, no murmur. No lower extremity edema   RESPIRATORY: Clear to auscultation bilaterally, no wheezes, no crackles.  GI: Non-distended, normal bowel sounds, soft, non-tender.  SKIN: No rashes, sores.   NEUROLOGY: Alert and oriented x3, follows commands, speech and language normal.       Data   Data reviewed today: I reviewed all medications, new labs and imaging results over the last 24 hours. I personally reviewed the abdominal CT image(s) showing left hydronephrosis and left urteral stone.    Recent Labs   Lab  11/15/22  1156   WBC 15.8*   HGB 13.0   MCV 91         POTASSIUM 4.7   CHLORIDE 106   CO2 23   BUN 15.4   CR 0.95   ANIONGAP 9   ANH 8.7   GLC 99     Recent Results (from the past 24 hour(s))   US Renal Complete Non-Vascular    Narrative    PROCEDURE: US RENAL COMPLETE NON-VASCULAR 11/15/2022 12:34 PM    HISTORY: left sided flank pain, likely ureteral stone    COMPARISONS: None.    TECHNIQUE: Routine renal ultrasound.    FINDINGS: Kidneys are normal in size. Right kidney measures 11.7 cm in  length and the left 11.6 cm in length. There is no renal mass. No  definite renal stone is seen.    The left renal pelvis is mildly dilated when compared to the right but  there is not significant dilatation of the calyceal system. Renal  pelvis measures about 1.9 cm.         Impression    IMPRESSION: Mildly prominent left renal pelvis without significant  dilatation of the calyces.    VIOLETA PAYNE MD         SYSTEM ID:  [id number]   CT Abdomen Pelvis w/o Contrast    Narrative    Exam:CT ABDOMEN PELVIS W/O CONTRAST    History:  42 years Female left ureteral stone with infection    Comparison:None    Technique: Axial CT imaging of the abdomen and pelvis was performed  without contrast. Coronal and sagittal reconstructions were obtained.    This exam was performed using one or more of the following dose  reduction techniques:  Automated exposure control, adjustment of the LESTER and/or KV according  to patient's size, and/or use of iterative reconstruction technique.    Findings:      Lung bases:The lung bases are clear.        Kidneys: There is left-sided hydronephrosis and perinephric edema.  There is an obstructing stone at the ureteropelvic junction measuring  7 x 6 x 9 mm. There is a 3 mm nonobstructing stone at the lower pole  of the left kidney.       Abdomen: Evaluation is limited due to lack of intravenous contrast.  Unenhanced images of the liver spleen pancreas and adrenal glands are  unremarkable.  No  abnormally distended or thickened loops of bowel are present.         Pelvis:There is no mass or lymphadenopathy. No abnormal fluid  collections are present.                Impression    Impression: There is a proximal obstructing left ureteral calculus at  the ureteropelvic junction. The calculus measures 7 x 6 x 9 mm. There  is hydronephrosis and perinephric edema.    NAHUM MARTIN MD         SYSTEM ID:  [id number]   XR Surgery ANTONIO L/T 5 Min Fluoro    Narrative    This exam was marked as non-reportable because it will not be read by a   radiologist or a [facility] non-radiologist provider.

## 2022-11-15 NOTE — ED TRIAGE NOTES
Pt arrives via private vehicle with left abdominal pain and left flank pain. PT does have a history of kidney infections and stones, states it feels similar to that. Pain started suddenly at 0800. Pt took pamprin at 0830 with no relief.      Triage Assessment     Row Name 11/15/22 1031       Triage Assessment (Adult)    Airway WDL WDL       Respiratory WDL    Respiratory WDL WDL       Skin Circulation/Temperature WDL    Skin Circulation/Temperature WDL WDL       Cardiac WDL    Cardiac WDL WDL       Peripheral/Neurovascular WDL    Peripheral Neurovascular WDL WDL       Cognitive/Neuro/Behavioral WDL    Cognitive/Neuro/Behavioral WDL WDL

## 2022-11-15 NOTE — ED PROVIDER NOTES
History     Chief Complaint   Patient presents with     Abdominal Pain     Flank Pain     The history is provided by the patient and a friend.     Abril Schulz is a 42 year old female here with left flank pain and LLQ abdominal pain. She had onset of back pain on the left side this morning at about 8 AM as she was driving. The pain got worse and moved around to the LLQ of her abdomen. By the time she got home she was writhing in pain and could not find a comfortable position. She has a history of ureteral stones but says that this does not feel better.     Allergies:  No Known Allergies    Problem List:    Patient Active Problem List    Diagnosis Date Noted     Left ureteral stone 11/15/2022     Priority: Medium     Hydronephrosis 11/15/2022     Priority: Medium     Generalized hyperhidrosis 01/24/2018     Priority: Medium     Tobacco abuse 01/24/2018     Priority: Medium     Depo-Provera contraceptive status 12/12/2017     Priority: Medium        Past Medical History:    Past Medical History:   Diagnosis Date     History of cervical dysplasia        Past Surgical History:    Past Surgical History:   Procedure Laterality Date     ARTHROSCOPY KNEE      2007,right     CONIZATION LEEP      2002       Family History:    Family History   Problem Relation Age of Onset     Family History Negative Father         Good Health     Other - See Comments Mother         MS in remission     Other - See Comments Sister         Endometriosis     Family History Negative Brother         Good Health     Heart Disease Son         Heart Disease, Bicuspid aortic valve     Cancer Maternal Grandmother         Cancer     Cancer Maternal Grandfather         Cancer     Cancer Paternal Grandmother 95        Cancer     Cancer Paternal Grandfather 97        Cancer       Social History:  Marital Status:   [2]  Social History     Tobacco Use     Smoking status: Every Day     Packs/day: 1.00     Years: 16.00     Pack years: 16.00      "Types: Cigarettes     Smokeless tobacco: Never     Tobacco comments:     Quit smoking: has been trying   Substance Use Topics     Alcohol use: Yes     Alcohol/week: 0.0 standard drinks     Comment: Alcoholic Drinks/day: occasionally, maybe four per month     Drug use: Unknown     Types: Other     Comment: Drug use: No        Medications:    ALPRAZolam (XANAX) 0.5 MG tablet      Review of Systems   Constitutional: Negative for fever.   Gastrointestinal: Positive for abdominal pain.   Genitourinary: Positive for flank pain.   All other systems reviewed and are negative.      Physical Exam   BP: (!) 135/93  Pulse: 72  Temp: 98.2  F (36.8  C)  Resp: 16  Height: 165.1 cm (5' 5\")  Weight: 61.2 kg (135 lb)  SpO2: 100 %      Physical Exam  Vitals and nursing note reviewed.   Constitutional:       General: She is in acute distress.      Appearance: She is well-developed. She is ill-appearing. She is not toxic-appearing or diaphoretic.   Cardiovascular:      Rate and Rhythm: Normal rate and regular rhythm.      Heart sounds: Normal heart sounds.   Pulmonary:      Effort: Pulmonary effort is normal.      Breath sounds: Normal breath sounds.   Abdominal:      General: Abdomen is flat. Bowel sounds are normal.      Palpations: Abdomen is soft.      Tenderness: There is abdominal tenderness in the left lower quadrant. There is left CVA tenderness.      Comments: She has minimal LLQ abdominal pain and minimal left CVA tenderness.    Skin:     General: Skin is warm and dry.   Neurological:      General: No focal deficit present.      Mental Status: She is alert and oriented to person, place, and time.         Results for orders placed or performed during the hospital encounter of 11/15/22 (from the past 24 hour(s))   UA with Microscopic reflex to Culture    Specimen: Urine, Midstream   Result Value Ref Range    Color Urine Yellow Colorless, Straw, Light Yellow, Yellow    Appearance Urine Cloudy (A) Clear    Glucose Urine Negative " Negative mg/dL    Bilirubin Urine Negative Negative    Ketones Urine 40 (A) Negative mg/dL    Specific Gravity Urine 1.030 1.000 - 1.030    Blood Urine Negative Negative    pH Urine 8.5 5.0 - 9.0    Protein Albumin Urine 70 (A) Negative mg/dL    Urobilinogen Urine Normal Normal, 2.0 mg/dL    Nitrite Urine Positive (A) Negative    Leukocyte Esterase Urine Moderate (A) Negative    Bacteria Urine Many (A) None Seen /HPF    Mucus Urine Present (A) None Seen /LPF    Amorphous Crystals Urine Few (A) None Seen /HPF    RBC Urine 9 (H) <=2 /HPF    WBC Urine 40 (H) <=5 /HPF    Squamous Epithelials Urine 8 (H) <=1 /HPF    Narrative    Urine Culture ordered based on laboratory criteria   HCG qualitative urine (UPT)   Result Value Ref Range    hCG Urine Qualitative Negative Negative   Basic metabolic panel   Result Value Ref Range    Sodium 138 136 - 145 mmol/L    Potassium 4.7 3.4 - 5.3 mmol/L    Chloride 106 98 - 107 mmol/L    Carbon Dioxide (CO2) 23 22 - 29 mmol/L    Anion Gap 9 7 - 15 mmol/L    Urea Nitrogen 15.4 6.0 - 20.0 mg/dL    Creatinine 0.95 0.51 - 0.95 mg/dL    Calcium 8.7 8.6 - 10.0 mg/dL    Glucose 99 70 - 99 mg/dL    GFR Estimate 76 >60 mL/min/1.73m2   CBC with platelets differential    Narrative    The following orders were created for panel order CBC with platelets differential.  Procedure                               Abnormality         Status                     ---------                               -----------         ------                     CBC with platelets and d...[[id number]]  Abnormal            Final result                 Please view results for these tests on the individual orders.   CRP inflammation   Result Value Ref Range    CRP Inflammation <3.00 <5.00 mg/L   CBC with platelets and differential   Result Value Ref Range    WBC Count 15.8 (H) 4.0 - 11.0 10e3/uL    RBC Count 4.20 3.80 - 5.20 10e6/uL    Hemoglobin 13.0 11.7 - 15.7 g/dL    Hematocrit 38.4 35.0 - 47.0 %    MCV 91 78 - 100 fL     MCH 31.0 26.5 - 33.0 pg    MCHC 33.9 31.5 - 36.5 g/dL    RDW 12.1 10.0 - 15.0 %    Platelet Count 276 150 - 450 10e3/uL    % Neutrophils 90 %    % Lymphocytes 5 %    % Monocytes 5 %    % Eosinophils 0 %    % Basophils 0 %    % Immature Granulocytes 0 %    NRBCs per 100 WBC 0 <1 /100    Absolute Neutrophils 14.1 (H) 1.6 - 8.3 10e3/uL    Absolute Lymphocytes 0.8 0.8 - 5.3 10e3/uL    Absolute Monocytes 0.7 0.0 - 1.3 10e3/uL    Absolute Eosinophils 0.0 0.0 - 0.7 10e3/uL    Absolute Basophils 0.0 0.0 - 0.2 10e3/uL    Absolute Immature Granulocytes 0.1 <=0.4 10e3/uL    Absolute NRBCs 0.0 10e3/uL   Extra Tube    Narrative    The following orders were created for panel order Extra Tube.  Procedure                               Abnormality         Status                     ---------                               -----------         ------                     Extra Blue Top Tube[[id number]]                              Final result               Extra Serum Separator Tu...[[id number]]                      Final result               Extra Green Top (Lithium...[[id number]]                      Final result                 Please view results for these tests on the individual orders.   Extra Blue Top Tube   Result Value Ref Range    Hold Specimen JIC    Extra Serum Separator Tube (SST)   Result Value Ref Range    Hold Specimen JIC    Extra Green Top (Lithium Heparin) ON ICE   Result Value Ref Range    Hold Specimen JIC    US Renal Complete Non-Vascular    Narrative    PROCEDURE: US RENAL COMPLETE NON-VASCULAR 11/15/2022 12:34 PM    HISTORY: left sided flank pain, likely ureteral stone    COMPARISONS: None.    TECHNIQUE: Routine renal ultrasound.    FINDINGS: Kidneys are normal in size. Right kidney measures 11.7 cm in  length and the left 11.6 cm in length. There is no renal mass. No  definite renal stone is seen.    The left renal pelvis is mildly dilated when compared to the right but  there is not significant dilatation of the  calyceal system. Renal  pelvis measures about 1.9 cm.         Impression    IMPRESSION: Mildly prominent left renal pelvis without significant  dilatation of the calyces.    VIOLETA PAYNE MD         SYSTEM ID:  [id number]   CT Abdomen Pelvis w/o Contrast    Narrative    Exam:CT ABDOMEN PELVIS W/O CONTRAST    History:  42 years Female left ureteral stone with infection    Comparison:None    Technique: Axial CT imaging of the abdomen and pelvis was performed  without contrast. Coronal and sagittal reconstructions were obtained.    This exam was performed using one or more of the following dose  reduction techniques:  Automated exposure control, adjustment of the LESTER and/or KV according  to patient's size, and/or use of iterative reconstruction technique.    Findings:      Lung bases:The lung bases are clear.        Kidneys: There is left-sided hydronephrosis and perinephric edema.  There is an obstructing stone at the ureteropelvic junction measuring  7 x 6 x 9 mm. There is a 3 mm nonobstructing stone at the lower pole  of the left kidney.       Abdomen: Evaluation is limited due to lack of intravenous contrast.  Unenhanced images of the liver spleen pancreas and adrenal glands are  unremarkable.  No abnormally distended or thickened loops of bowel are present.         Pelvis:There is no mass or lymphadenopathy. No abnormal fluid  collections are present.                Impression    Impression: There is a proximal obstructing left ureteral calculus at  the ureteropelvic junction. The calculus measures 7 x 6 x 9 mm. There  is hydronephrosis and perinephric edema.    NAHUM MARTIN MD         SYSTEM ID:  [id number]       Medications   HYDROmorphone (DILAUDID) injection 1 mg (1 mg Intravenous Given 11/15/22 1354)   cefTRIAXone in d5w (ROCEPHIN) intermittent infusion 1 g (has no administration in time range)   ketorolac (TORADOL) injection 30 mg (30 mg Intravenous Given 11/15/22 1039)   ondansetron (ZOFRAN) injection  "4 mg (4 mg Intravenous Given 11/15/22 1200)       Assessments & Plan (with Medical Decision Making)  Abril Schulz is a 42 year old female here with left flank pain and LLQ abdominal pain. She had onset of back pain on the left side this morning at about 8 AM as she was driving. The pain got worse and moved around to the LLQ of her abdomen. By the time she got home she was writhing in pain and could not find a comfortable position. She has a history of ureteral stones but says that this does not feel better.  VS in the ED /61   Pulse 67   Temp 98.2  F (36.8  C)   Resp 16   Ht 1.651 m (5' 5\")   Wt 61.2 kg (135 lb)   SpO2 100%   BMI 22.47 kg/m    Exam shows minimal LLQ and left flank tenderness.   We gave Toradol, Dilaudid and Zofran with IV fluids. Labs show CBC with WBC 15,800, BMP normal, CRP normal, UA is a dirty catch but likely positive for UTI, UPT negative.  Ultrasound shows left ureteral and renal pelvic dilatation.   I spoke with Dr Phillips who recommends a CT to help with decision-making about whether or not she needs stent. CT shows there is a proximal obstructing left ureteral calculus at the ureteropelvic junction. The calculus measures 7 x 6 x 9 mm. There is hydronephrosis and perinephric edema. I spoke with Dr Phillips who recommends a stent for her. She has not eaten since yesterday and we will keep her NPO.  Rocephin started. He is planning OR at 3 PM.  I spoke with Dr Forde who will save a bed for her.  At 2:20 PM I just spoke with her .      I have reviewed the nursing notes.    I have reviewed the findings, diagnosis, plan and need for follow up with the patient.    Final diagnoses:   Left ureteral stone       11/15/2022   [facility] AND [facility]     Deshawn Vallejo MD  11/15/22 1406       Deshawn Vallejo MD  11/15/22 1423    "

## 2022-11-16 PROBLEM — Z11.52 ENCOUNTER FOR SCREENING LABORATORY TESTING FOR COVID-19 VIRUS: Status: ACTIVE | Noted: 2022-11-16

## 2022-11-16 PROBLEM — N39.0 URINARY TRACT INFECTION WITHOUT HEMATURIA, SITE UNSPECIFIED: Status: ACTIVE | Noted: 2022-11-16

## 2022-11-16 LAB
ANION GAP SERPL CALCULATED.3IONS-SCNC: 7 MMOL/L (ref 7–15)
BUN SERPL-MCNC: 13.2 MG/DL (ref 6–20)
CALCIUM SERPL-MCNC: 7.5 MG/DL (ref 8.6–10)
CHLORIDE SERPL-SCNC: 111 MMOL/L (ref 98–107)
CREAT SERPL-MCNC: 0.8 MG/DL (ref 0.51–0.95)
DEPRECATED HCO3 PLAS-SCNC: 20 MMOL/L (ref 22–29)
ERYTHROCYTE [DISTWIDTH] IN BLOOD BY AUTOMATED COUNT: 12.2 % (ref 10–15)
GFR SERPL CREATININE-BSD FRML MDRD: >90 ML/MIN/1.73M2
GLUCOSE SERPL-MCNC: 107 MG/DL (ref 70–99)
HCT VFR BLD AUTO: 33.8 % (ref 35–47)
HGB BLD-MCNC: 11.3 G/DL (ref 11.7–15.7)
HOLD SPECIMEN: NORMAL
LACTATE SERPL-SCNC: 1.4 MMOL/L (ref 0.7–2)
MCH RBC QN AUTO: 30.7 PG (ref 26.5–33)
MCHC RBC AUTO-ENTMCNC: 33.4 G/DL (ref 31.5–36.5)
MCV RBC AUTO: 92 FL (ref 78–100)
PLATELET # BLD AUTO: 207 10E3/UL (ref 150–450)
POTASSIUM SERPL-SCNC: 4.1 MMOL/L (ref 3.4–5.3)
RBC # BLD AUTO: 3.68 10E6/UL (ref 3.8–5.2)
SODIUM SERPL-SCNC: 138 MMOL/L (ref 136–145)
WBC # BLD AUTO: 23.3 10E3/UL (ref 4–11)

## 2022-11-16 PROCEDURE — 36415 COLL VENOUS BLD VENIPUNCTURE: CPT | Performed by: INTERNAL MEDICINE

## 2022-11-16 PROCEDURE — 99222 1ST HOSP IP/OBS MODERATE 55: CPT | Performed by: INTERNAL MEDICINE

## 2022-11-16 PROCEDURE — 250N000013 HC RX MED GY IP 250 OP 250 PS 637: Performed by: INTERNAL MEDICINE

## 2022-11-16 PROCEDURE — 120N000001 HC R&B MED SURG/OB

## 2022-11-16 PROCEDURE — 250N000011 HC RX IP 250 OP 636: Performed by: INTERNAL MEDICINE

## 2022-11-16 PROCEDURE — G0378 HOSPITAL OBSERVATION PER HR: HCPCS

## 2022-11-16 PROCEDURE — 258N000003 HC RX IP 258 OP 636: Performed by: INTERNAL MEDICINE

## 2022-11-16 PROCEDURE — 82435 ASSAY OF BLOOD CHLORIDE: CPT | Performed by: INTERNAL MEDICINE

## 2022-11-16 PROCEDURE — 82374 ASSAY BLOOD CARBON DIOXIDE: CPT | Performed by: INTERNAL MEDICINE

## 2022-11-16 PROCEDURE — 83605 ASSAY OF LACTIC ACID: CPT | Performed by: INTERNAL MEDICINE

## 2022-11-16 PROCEDURE — 85027 COMPLETE CBC AUTOMATED: CPT | Performed by: INTERNAL MEDICINE

## 2022-11-16 RX ORDER — LIDOCAINE 40 MG/G
CREAM TOPICAL
Status: DISCONTINUED | OUTPATIENT
Start: 2022-11-16 | End: 2022-11-16

## 2022-11-16 RX ORDER — CEFTRIAXONE SODIUM 2 G/50ML
2 INJECTION, SOLUTION INTRAVENOUS EVERY 24 HOURS
Status: DISCONTINUED | OUTPATIENT
Start: 2022-11-16 | End: 2022-11-17 | Stop reason: HOSPADM

## 2022-11-16 RX ADMIN — SODIUM CHLORIDE 1000 ML: 9 INJECTION, SOLUTION INTRAVENOUS at 03:29

## 2022-11-16 RX ADMIN — SODIUM CHLORIDE: 9 INJECTION, SOLUTION INTRAVENOUS at 04:38

## 2022-11-16 RX ADMIN — KETOROLAC TROMETHAMINE 15 MG: 15 INJECTION, SOLUTION INTRAMUSCULAR; INTRAVENOUS at 13:02

## 2022-11-16 RX ADMIN — ACETAMINOPHEN 975 MG: 325 TABLET ORAL at 08:30

## 2022-11-16 RX ADMIN — SODIUM CHLORIDE: 9 INJECTION, SOLUTION INTRAVENOUS at 06:01

## 2022-11-16 RX ADMIN — SODIUM CHLORIDE: 9 INJECTION, SOLUTION INTRAVENOUS at 15:27

## 2022-11-16 RX ADMIN — CEFTRIAXONE SODIUM 2 G: 2 INJECTION, SOLUTION INTRAVENOUS at 10:59

## 2022-11-16 RX ADMIN — ACETAMINOPHEN 975 MG: 325 TABLET ORAL at 23:02

## 2022-11-16 RX ADMIN — ACETAMINOPHEN 975 MG: 325 TABLET ORAL at 15:26

## 2022-11-16 ASSESSMENT — ACTIVITIES OF DAILY LIVING (ADL)
ADLS_ACUITY_SCORE: 31

## 2022-11-16 NOTE — PLAN OF CARE
"  Problem: Plan of Care - These are the overarching goals to be used throughout the patient stay.    Goal: Plan of Care Review  Description: The Plan of Care Review/Shift note should be completed every shift.  The Outcome Evaluation is a brief statement about your assessment that the patient is improving, declining, or no change.  This information will be displayed automatically on your shift note.  Outcome: Progressing  Flowsheets (Taken 11/16/2022 0246)  Plan of Care Reviewed With: patient  Overall Patient Progress: improving  Goal: Patient-Specific Goal (Individualized)  Description: You can add care plan individualizations to a care plan. Examples of Individualization might be:  \"Parent requests to be called daily at 9am for status\", \"I have a hard time hearing out of my right ear\", or \"Do not touch me to wake me up as it startles me\".  Outcome: Progressing  Goal: Absence of Hospital-Acquired Illness or Injury  Outcome: Progressing  Goal: Optimal Comfort and Wellbeing  Outcome: Progressing  Goal: Readiness for Transition of Care  Outcome: Progressing   Goal Outcome Evaluation:      Plan of Care Reviewed With: patient    Overall Patient Progress: improvingOverall Patient Progress: improving           "

## 2022-11-16 NOTE — PROGRESS NOTES
[facility]    Medicine Progress Note - Hospitalist Service    Date of Admission:  11/15/2022    Assessment & Plan      Abril Schulz is a 42 year old female admitted on 11/15/2022. She has left flank pain and found to have a left urteral stone.     Principal Problem:    Sepsis due to pyelonephritis  Assessment: postprocedure developed fever, hypotension, leukocytosis. 4/4 blood culture positive for gram negative rods   Plan: Continue day 2 ceftriaxone, intravenous fluids as needed      Left ureteral stone  Assessment: postoperative day # 1 stent placement. Pain improved. Fever and hypotension overnight.  Plan: as above    Active Problems:    Hydronephrosis  Assessment: present on admission, secondary to ureteral stone      Urinary tract infection  Assessment: present on admission, urine culture pending  Plan: empiric ceftriaxone, await culture results      Leukocytosis  Assessment: present on admission, secondary to urinary tract infection   Plan: check in AM       Diet: Regular Diet Adult    DVT Prophylaxis: Pneumatic Compression Devices  Willard Catheter: Not present  Central Lines: None  Cardiac Monitoring: None  Code Status: Full Code      Disposition Plan      Expected Discharge Date: 11/18/2022      Destination: home          The patient's care was discussed with the Patient.    Taye Forde MD  Hospitalist Service  [facility]  Securely message with the Vocera Web Console (learn more here)  Text page via [employer] Paging/Directory       Interval History   Complains of fevers, chills. No appetite. Complains of lower abdomen pain.     Data reviewed today: I reviewed all medications, new labs and imaging results over the last 24 hours. I personally reviewed no images or EKG's today.    Physical Exam   Vital Signs: Temp: 98.5  F (36.9  C) Temp src: Tympanic BP: 101/58 Pulse: 73   Resp: 16 SpO2: 97 % O2 Device: None (Room air)    Weight: 147 lbs 4.8 oz  GENERAL:  Comfortable, no apparent distress.  CARDIOVASCULAR: regular rate and rhythm, no murmur. No lower extremity edema   RESPIRATORY: Clear to auscultation bilaterally, no wheezes or crackles.  GI: non-tender, non-distended, normal bowel sounds.   SKIN: warm periphery, no rashes      Data   Recent Labs   Lab 11/16/22  0655 11/15/22  1156   WBC 23.3* 15.8*   HGB 11.3* 13.0   MCV 92 91    276    138   POTASSIUM 4.1 4.7   CHLORIDE 111* 106   CO2 20* 23   BUN 13.2 15.4   CR 0.80 0.95   ANIONGAP 7 9   ANH 7.5* 8.7   * 99     Recent Results (from the past 24 hour(s))   CT Abdomen Pelvis w/o Contrast    Narrative    Exam:CT ABDOMEN PELVIS W/O CONTRAST    History:  42 years Female left ureteral stone with infection    Comparison:None    Technique: Axial CT imaging of the abdomen and pelvis was performed  without contrast. Coronal and sagittal reconstructions were obtained.    This exam was performed using one or more of the following dose  reduction techniques:  Automated exposure control, adjustment of the LESTER and/or KV according  to patient's size, and/or use of iterative reconstruction technique.    Findings:      Lung bases:The lung bases are clear.        Kidneys: There is left-sided hydronephrosis and perinephric edema.  There is an obstructing stone at the ureteropelvic junction measuring  7 x 6 x 9 mm. There is a 3 mm nonobstructing stone at the lower pole  of the left kidney.       Abdomen: Evaluation is limited due to lack of intravenous contrast.  Unenhanced images of the liver spleen pancreas and adrenal glands are  unremarkable.  No abnormally distended or thickened loops of bowel are present.         Pelvis:There is no mass or lymphadenopathy. No abnormal fluid  collections are present.                Impression    Impression: There is a proximal obstructing left ureteral calculus at  the ureteropelvic junction. The calculus measures 7 x 6 x 9 mm. There  is hydronephrosis and perinephric  edema.    NAHUM MARTIN MD         SYSTEM ID:  [id number]   XR Surgery ANTONIO L/T 5 Min Fluoro    Narrative    This exam was marked as non-reportable because it will not be read by a   radiologist or a [place] non-radiologist provider.           Medications     sodium chloride 100 mL/hr at 11/16/22 0601       sodium chloride 0.9%  1,000 mL Intravenous Once     cefTRIAXone  2 g Intravenous Q24H     scopolamine  1 patch Transdermal Q72H     scopolamine   Transdermal Q8H KOSTA     sodium chloride (PF)  3 mL Intracatheter Q8H

## 2022-11-16 NOTE — PLAN OF CARE
"Patient voiding adequately, VSS, Afebrile, tylenol given for pain which was effective.   Problem: Plan of Care - These are the overarching goals to be used throughout the patient stay.    Goal: Plan of Care Review  Description: The Plan of Care Review/Shift note should be completed every shift.  The Outcome Evaluation is a brief statement about your assessment that the patient is improving, declining, or no change.  This information will be displayed automatically on your shift note.  Outcome: Progressing  Flowsheets (Taken 11/16/2022 1724)  Plan of Care Reviewed With: patient  Goal: Patient-Specific Goal (Individualized)  Description: You can add care plan individualizations to a care plan. Examples of Individualization might be:  \"Parent requests to be called daily at 9am for status\", \"I have a hard time hearing out of my right ear\", or \"Do not touch me to wake me up as it startles me\".  Outcome: Progressing  Flowsheets (Taken 11/16/2022 1724)  Anxieties, Fears or Concerns: patient is getting bored  Goal: Absence of Hospital-Acquired Illness or Injury  Outcome: Progressing  Intervention: Identify and Manage Fall Risk  Recent Flowsheet Documentation  Taken 11/16/2022 1531 by Marquita Cain RN  Safety Promotion/Fall Prevention:   assistive device/personal items within reach   clutter free environment maintained   mobility aid in reach   nonskid shoes/slippers when out of bed   patient and family education   room organization consistent   safety round/check completed  Intervention: Prevent Skin Injury  Recent Flowsheet Documentation  Taken 11/16/2022 1531 by Marquita Cain RN  Body Position: position changed independently  Goal: Optimal Comfort and Wellbeing  Outcome: Progressing  Goal: Readiness for Transition of Care  Outcome: Progressing  Flowsheets (Taken 11/16/2022 1724)  Anticipated Changes Related to Illness: none  Intervention: Mutually Develop Transition Plan  Recent Flowsheet " Documentation  Taken 11/16/2022 1724 by Marquita Cain, RN  Anticipated Changes Related to Illness: none   Goal Outcome Evaluation:      Plan of Care Reviewed With: patient

## 2022-11-16 NOTE — PROGRESS NOTES
Patient blood pressures continued to be soft after bolus, updated MD. Second bolus ordered with continue to monitor.

## 2022-11-16 NOTE — PHARMACY-ADMISSION MEDICATION HISTORY
Pharmacy -- Admission Medication Reconciliation    Prior to admission (PTA) medications were reviewed and the patient's PTA medication list was updated.    Sources Consulted: patient, sure scripts    The reliability of this Medication Reconciliation is: Reliability: Reliable    The following significant changes were made:  Probiotic gummy ADDED  MVI gummy ADDED    In addition, the patient's allergies were reviewed with the patient and updated as follows:   Allergies: Patient has no known allergies.    The pharmacist has reviewed with the patient that all personal medications should be removed from the building or locked in the belongings safe.  Patient shall only take medications ordered by the physician and administered by the nursing staff.       Medication barriers identified: none   Medication adherence concerns: none   Understanding of emergency medications: n/a    Nohemy Guerra [facility], 11/15/2022,  7:25 PM

## 2022-11-16 NOTE — PROGRESS NOTES
Pt temp 100.1.  Tylenol given and Dr Forde updated.  No new orders at this time.  Rosemary Lima RN............................ 11/16/2022 9:30 AM

## 2022-11-17 VITALS
SYSTOLIC BLOOD PRESSURE: 124 MMHG | HEIGHT: 65 IN | DIASTOLIC BLOOD PRESSURE: 73 MMHG | OXYGEN SATURATION: 94 % | TEMPERATURE: 99.9 F | HEART RATE: 74 BPM | WEIGHT: 147.3 LBS | BODY MASS INDEX: 24.54 KG/M2 | RESPIRATION RATE: 18 BRPM

## 2022-11-17 LAB
ANION GAP SERPL CALCULATED.3IONS-SCNC: 9 MMOL/L (ref 7–15)
BACTERIA UR CULT: ABNORMAL
BACTERIA UR CULT: ABNORMAL
BUN SERPL-MCNC: 9.5 MG/DL (ref 6–20)
CALCIUM SERPL-MCNC: 7.8 MG/DL (ref 8.6–10)
CHLORIDE SERPL-SCNC: 108 MMOL/L (ref 98–107)
CREAT SERPL-MCNC: 0.89 MG/DL (ref 0.51–0.95)
DEPRECATED HCO3 PLAS-SCNC: 20 MMOL/L (ref 22–29)
ERYTHROCYTE [DISTWIDTH] IN BLOOD BY AUTOMATED COUNT: 12.1 % (ref 10–15)
GFR SERPL CREATININE-BSD FRML MDRD: 83 ML/MIN/1.73M2
GLUCOSE SERPL-MCNC: 106 MG/DL (ref 70–99)
HCT VFR BLD AUTO: 33.3 % (ref 35–47)
HGB BLD-MCNC: 11.3 G/DL (ref 11.7–15.7)
HOLD SPECIMEN: NORMAL
MCH RBC QN AUTO: 31 PG (ref 26.5–33)
MCHC RBC AUTO-ENTMCNC: 33.9 G/DL (ref 31.5–36.5)
MCV RBC AUTO: 91 FL (ref 78–100)
PLATELET # BLD AUTO: 201 10E3/UL (ref 150–450)
POTASSIUM SERPL-SCNC: 3.8 MMOL/L (ref 3.4–5.3)
RBC # BLD AUTO: 3.65 10E6/UL (ref 3.8–5.2)
SODIUM SERPL-SCNC: 137 MMOL/L (ref 136–145)
WBC # BLD AUTO: 11.4 10E3/UL (ref 4–11)

## 2022-11-17 PROCEDURE — 250N000011 HC RX IP 250 OP 636: Performed by: INTERNAL MEDICINE

## 2022-11-17 PROCEDURE — 36415 COLL VENOUS BLD VENIPUNCTURE: CPT | Performed by: INTERNAL MEDICINE

## 2022-11-17 PROCEDURE — 99239 HOSP IP/OBS DSCHRG MGMT >30: CPT | Performed by: INTERNAL MEDICINE

## 2022-11-17 PROCEDURE — 80048 BASIC METABOLIC PNL TOTAL CA: CPT | Performed by: INTERNAL MEDICINE

## 2022-11-17 PROCEDURE — 258N000003 HC RX IP 258 OP 636: Performed by: INTERNAL MEDICINE

## 2022-11-17 PROCEDURE — 85018 HEMOGLOBIN: CPT | Performed by: INTERNAL MEDICINE

## 2022-11-17 RX ORDER — CEFUROXIME AXETIL 500 MG/1
500 TABLET ORAL 2 TIMES DAILY
Qty: 14 TABLET | Refills: 0 | Status: SHIPPED | OUTPATIENT
Start: 2022-11-17 | End: 2022-11-24

## 2022-11-17 RX ADMIN — SODIUM CHLORIDE: 9 INJECTION, SOLUTION INTRAVENOUS at 00:49

## 2022-11-17 RX ADMIN — CEFTRIAXONE SODIUM 2 G: 2 INJECTION, SOLUTION INTRAVENOUS at 09:22

## 2022-11-17 RX ADMIN — SODIUM CHLORIDE: 9 INJECTION, SOLUTION INTRAVENOUS at 11:14

## 2022-11-17 ASSESSMENT — ACTIVITIES OF DAILY LIVING (ADL)
ADLS_ACUITY_SCORE: 18
DRESSING/BATHING_DIFFICULTY: NO
DOING_ERRANDS_INDEPENDENTLY_DIFFICULTY: NO
TOILETING_ISSUES: NO
ADLS_ACUITY_SCORE: 18
WALKING_OR_CLIMBING_STAIRS_DIFFICULTY: NO
CHANGE_IN_FUNCTIONAL_STATUS_SINCE_ONSET_OF_CURRENT_ILLNESS/INJURY: NO
ADLS_ACUITY_SCORE: 18
DIFFICULTY_EATING/SWALLOWING: NO
WEAR_GLASSES_OR_BLIND: NO
ADLS_ACUITY_SCORE: 18
CONCENTRATING,_REMEMBERING_OR_MAKING_DECISIONS_DIFFICULTY: NO
ADLS_ACUITY_SCORE: 18
DIFFICULTY_COMMUNICATING: NO
FALL_HISTORY_WITHIN_LAST_SIX_MONTHS: NO
ADLS_ACUITY_SCORE: 18
ADLS_ACUITY_SCORE: 18

## 2022-11-17 NOTE — PROGRESS NOTES
"Patient up indep in room, was febrile at start of shift was also painful in left lower back area, was given prn medication, Fever has come down through shift, Vital signs:  Temp: 99.9  F (37.7  C) Temp src: Tympanic BP: 125/71 Pulse: 92   Resp: 18 SpO2: 94 % O2 Device: None (Room air)   Height: 165.1 cm (5' 5\") Weight: 66.8 kg (147 lb 4.8 oz)  Estimated body mass index is 24.51 kg/m  as calculated from the following:    Height as of this encounter: 1.651 m (5' 5\").    Weight as of this encounter: 66.8 kg (147 lb 4.8 oz).  Urine continues to be darker benedict in color, with sufficient out put. No other issues at this time. Harmony Shafer RN on 11/17/2022 at 5:29 AM    "

## 2022-11-17 NOTE — DISCHARGE SUMMARY
[facility]  Hospitalist Discharge Summary      Date of Admission:  11/15/2022  Date of Discharge:  11/17/2022  Discharging Provider: Taye Forde MD  Discharge Service: Hospitalist Service    Discharge Diagnoses   Principal Problem:    Severe sepsis due to urinary tract infection, obstructing ureteral stone. E coli.     Left ureteral stone  Active Problems:    Hydronephrosis    Urinary tract infection, E coli    Leukocytosis        Follow-ups Needed After Discharge   Follow-up Appointments     Follow-up and recommended labs and tests       Urology Follow up with Dr. Phillips on Monday 11/21 @ 8am  Hospital follow up with Gloria Laura on 12/2 @ 12:40pm                 Discharge Disposition   Discharged to home  Condition at discharge: Stable      Hospital Course      Abril Schulz is a 42 year old female admitted on 11/15/2022. She has left flank pain and found to have a left ureteral stone. She underwent stent placement on 11/15.    Principal Problem:    Sepsis due to pyelonephritis  Assessment: postprocedure developed fever, hypotension, leukocytosis. 4/4 blood culture positive for gram negative rods, urine growing E coli and micro is reasonably confident it is the same organism. Discharged today on 7 days of ceftin.       Left ureteral stone  Assessment: postoperative day # 2 stent placement. Pain improved. Follow up in Urology clinic    Active Problems:    Hydronephrosis  Assessment: present on admission, secondary to ureteral stone      Urinary tract infection  Assessment: present on admission, urine culture growing E coli  Plan: empiric ceftriaxone, switched to ceftin on discharge based on urine culture       Leukocytosis  Assessment: present on admission, secondary to urinary tract infection, improving.       Consultations This Hospital Stay   None    Code Status   Full Code    Time Spent on this Encounter   I, Taye Forde MD, personally saw the patient today and spent  greater than 30 minutes discharging this patient.       Taye Forde MD  [facility]  [address]  [place] [address]  Phone: [phone]  Fax: [phone]  ______________________________________________________________________    Physical Exam   Vital Signs: Temp: 99.9  F (37.7  C) Temp src: Tympanic BP: 124/73 Pulse: 74   Resp: 18 SpO2: 94 % O2 Device: None (Room air)    Weight: 147 lbs 4.8 oz  GENERAL: Comfortable, no apparent distress.  CARDIOVASCULAR: regular rate and rhythm, no murmur. No lower extremity edema   RESPIRATORY: Clear to auscultation bilaterally, no wheezes or crackles.  GI: non-tender, non-distended, normal bowel sounds.   SKIN: warm periphery, no rashes         Primary Care Physician   Gloria Laura    Discharge Orders      Reason for your hospital stay    Kidney stone and septic shock     Follow-up and recommended labs and tests     Urology Follow up with Dr. Phillips on Monday 11/21 @ 8am  Hospital follow up with Gloria Laura on 12/2 @ 12:40pm     Activity    Your activity upon discharge: activity as tolerated     Diet    Follow this diet upon discharge: Orders Placed This Encounter      Regular Diet Adult       Significant Results and Procedures   Most Recent 3 CBC's:Recent Labs   Lab Test 11/17/22 0930 11/16/22  0655 11/15/22  1156   WBC 11.4* 23.3* 15.8*   HGB 11.3* 11.3* 13.0   MCV 91 92 91    207 276     Most Recent 3 BMP's:Recent Labs   Lab Test 11/17/22  0930 11/16/22  0655 11/15/22  1156    138 138   POTASSIUM 3.8 4.1 4.7   CHLORIDE 108* 111* 106   CO2 20* 20* 23   BUN 9.5 13.2 15.4   CR 0.89 0.80 0.95   ANIONGAP 9 7 9   ANH 7.8* 7.5* 8.7   * 107* 99     Most Recent 2 LFT's:No lab results found.  7-Day Micro Results     Collected Updated Procedure Result Status      11/15/2022 1525 11/17/2022 0850 Urine Culture [[id number]]    (Abnormal)   Urine from Kidney, Left    Final result Component Value   Culture >100,000 CFU/mL  Escherichia coli        Susceptibility      Escherichia coli      JOLENE      Amoxicillin/Clavulanate <=8  Susceptible      Ampicillin >16  Resistant     Ampicillin/ Sulbactam >16  Resistant     Aztreonam <=4  Susceptible      Cefazolin 4  Susceptible      Cefepime <=2  Susceptible      Cefoxitin <=8  Susceptible      Cefpodoxime <=2  Susceptible      Ceftazidime <=1  Susceptible      Ceftriaxone <=1  Susceptible      Cefuroxime <=4  Susceptible      Ciprofloxacin   See below  [1]       Ertapenem <=0.5  Susceptible      Gentamicin <=4  Susceptible      Imipenem <=1  Susceptible      Levofloxacin   See below  [2]       Nitrofurantoin <=32  Susceptible      Piperacillin/Tazobactam <=16  Intermediate      Tetracycline <=4  Susceptible      Tobramycin <=4  Susceptible      Trimethoprim <=8  Susceptible      Trimethoprim/Sulfamethoxazole <=2/38  Susceptible                   [1]  Ciprofloxacin not resistant.  Due to test limitations, lab cannot provide JOLENE to determine susceptibility.     [2]  Levofloxacin not resistant.  Due to test limitations, lab cannot provide JOLENE to determine susceptibility.                 11/15/2022 1414 11/16/2022 0310 Blood Culture Line, venous [[id number]]    (Abnormal)   Blood from Line, venous    Preliminary result Component Value   Culture Gram negative bacilli  [P]                11/15/2022 1409 11/15/2022 1507 Asymptomatic Influenza A/B & SARS-CoV2 (COVID-19) Virus PCR Multiplex Nose [[id number]]    Swab from Nose    Final result Component Value   Influenza A PCR Negative   Influenza B PCR Negative   RSV PCR Negative   SARS CoV2 PCR Negative   NEGATIVE: SARS-CoV-2 (COVID-19) RNA not detected, presumed negative.            11/15/2022 1407 11/16/2022 0309 Blood Culture Arm, Right [[id number]]    (Abnormal)   Blood from Arm, Right    Preliminary result Component Value   Culture Gram negative bacilli  [P]                11/15/2022 1127 11/17/2022 0846 Urine Culture [[id number]]     (Abnormal)   Urine, Midstream    Final result Component Value   Culture >100,000 CFU/mL Escherichia coli        Susceptibility      Escherichia coli      JOLENE      Amoxicillin/Clavulanate <=8  Susceptible      Ampicillin >16  Resistant     Ampicillin/ Sulbactam >16  Resistant     Aztreonam <=4  Susceptible      Cefazolin <=2  Susceptible      Cefepime <=2  Susceptible      Cefoxitin <=8  Susceptible      Cefpodoxime <=2  Susceptible      Ceftazidime <=1  Susceptible      Ceftriaxone <=1  Susceptible      Cefuroxime <=4  Susceptible      Ciprofloxacin   See below  [1]       Ertapenem <=0.5  Susceptible      Gentamicin <=4  Susceptible      Imipenem <=1  Susceptible      Levofloxacin   See below  [2]       Nitrofurantoin <=32  Susceptible      Piperacillin/Tazobactam <=16  Intermediate      Tetracycline <=4  Susceptible      Tobramycin <=4  Susceptible      Trimethoprim <=8  Susceptible      Trimethoprim/Sulfamethoxazole <=2/38  Susceptible                   [1]  Ciprofloxacin not resistant.  Due to test limitations, lab cannot provide JOLENE to determine susceptibility.     [2]  Levofloxacin not resistant.  Due to test limitations, lab cannot provide JOLENE to determine susceptibility.                   ,   Results for orders placed or performed during the hospital encounter of 11/15/22   US Renal Complete Non-Vascular    Narrative    PROCEDURE: US RENAL COMPLETE NON-VASCULAR 11/15/2022 12:34 PM    HISTORY: left sided flank pain, likely ureteral stone    COMPARISONS: None.    TECHNIQUE: Routine renal ultrasound.    FINDINGS: Kidneys are normal in size. Right kidney measures 11.7 cm in  length and the left 11.6 cm in length. There is no renal mass. No  definite renal stone is seen.    The left renal pelvis is mildly dilated when compared to the right but  there is not significant dilatation of the calyceal system. Renal  pelvis measures about 1.9 cm.         Impression    IMPRESSION: Mildly prominent left renal pelvis  without significant  dilatation of the calyces.    VIOLETA PAYNE MD         SYSTEM ID:  [id number]   CT Abdomen Pelvis w/o Contrast    Narrative    Exam:CT ABDOMEN PELVIS W/O CONTRAST    History:  42 years Female left ureteral stone with infection    Comparison:None    Technique: Axial CT imaging of the abdomen and pelvis was performed  without contrast. Coronal and sagittal reconstructions were obtained.    This exam was performed using one or more of the following dose  reduction techniques:  Automated exposure control, adjustment of the LESTER and/or KV according  to patient's size, and/or use of iterative reconstruction technique.    Findings:      Lung bases:The lung bases are clear.        Kidneys: There is left-sided hydronephrosis and perinephric edema.  There is an obstructing stone at the ureteropelvic junction measuring  7 x 6 x 9 mm. There is a 3 mm nonobstructing stone at the lower pole  of the left kidney.       Abdomen: Evaluation is limited due to lack of intravenous contrast.  Unenhanced images of the liver spleen pancreas and adrenal glands are  unremarkable.  No abnormally distended or thickened loops of bowel are present.         Pelvis:There is no mass or lymphadenopathy. No abnormal fluid  collections are present.                Impression    Impression: There is a proximal obstructing left ureteral calculus at  the ureteropelvic junction. The calculus measures 7 x 6 x 9 mm. There  is hydronephrosis and perinephric edema.    NAHUM MARTIN MD         SYSTEM ID:  [id number]   XR Surgery ANTONIO L/T 5 Min Fluoro    Narrative    This exam was marked as non-reportable because it will not be read by a   radiologist or a [place] non-radiologist provider.               Discharge Medications   Current Discharge Medication List      START taking these medications    Details   cefuroxime (CEFTIN) 500 MG tablet Take 1 tablet (500 mg) by mouth 2 times daily for 7 days  Qty: 14 tablet, Refills: 0     Associated Diagnoses: Urinary tract infection without hematuria, site unspecified         CONTINUE these medications which have NOT CHANGED    Details   ALPRAZolam (XANAX) 0.5 MG tablet Take 1 tablet (0.5 mg) by mouth 2 times daily as needed for anxiety  Qty: 10 tablet, Refills: 0    Associated Diagnoses: Anxiety      Multiple Vitamins-Minerals (WOMENS MULTI GUMMIES) CHEW Take 1 chew tab by mouth daily      Probiotic Product (PROBIOTIC GUMMIES PO) Take 1 chew tab by mouth daily           Allergies   No Known Allergies

## 2022-11-17 NOTE — PROGRESS NOTES
SAFETY CHECKLIST  ID Bands and Risk clasps correct and in place (DNR, Fall risk, Allergy, Latex, Limb):  Yes  All Lines Reconciled and labeled correctly: Yes  Whiteboard updated:Yes  Environmental interventions: Yes  Verify Tele #:   Harmony Shafer RN on 11/16/2022 at 7:20 PM

## 2022-11-17 NOTE — PHARMACY - DISCHARGE MEDICATION RECONCILIATION AND EDUCATION
Pharmacy:  Discharge Counseling and Medication Reconciliation    Abril S Zeus  [address] [place] [address]  [phone] (home)   42 year old female  PCP: Gloria Laura    Allergies: Patient has no known allergies.    Discharge Counseling:    Pharmacist met with patient today to review the medication portion of the After Visit Summary (with an emphasis on NEW medications) and to address patient's questions/concerns.    Summary of Education: Counseled patient on new medication of Cefuroxime, including indication, administration, and possible common side effects. Advised patient to  antibiotic and start tomorrow morning, as patient received Ceftriaxone this AM which will provide 24 hours of adequate coverage.    Materials Provided:  MedCounselor sheets printed from Clinical Pharmacology on: Cefuroxime    Discharge Medication Reconciliation:    It has been determined that the patient has an adequate supply of medications available or which can be obtained from the patient's preferred pharmacy, which she has confirmed as: [facility] [place].    Thank you for the consult.    Abdi Young [facility]........November 17, 2022 12:09 PM

## 2022-11-17 NOTE — PLAN OF CARE
[facility] DISCHARGE NOTE    Patient discharged to home at 1:05 PM via ambulation. Accompanied by transport and staff. Discharge instructions reviewed with patient, opportunity offered to ask questions. Prescriptions sent with patient to fill . All belongings sent with patient.    Marquita Cain RNGoal Outcome Evaluation:      Plan of Care Reviewed With: patient

## 2022-11-18 ENCOUNTER — PATIENT OUTREACH (OUTPATIENT)
Dept: FAMILY MEDICINE | Facility: OTHER | Age: 42
End: 2022-11-18

## 2022-11-18 LAB
BACTERIA BLD CULT: ABNORMAL
BACTERIA BLD CULT: ABNORMAL

## 2022-11-18 NOTE — TELEPHONE ENCOUNTER
Transitional Care Management Phone Call and 30 Day Med Rec    Summary of hospitalization: [facility] Discharge     DISCHARGE DIAGNOSIS:   Principal Problem:    Severe sepsis due to urinary tract infection, obstructing ureteral stone. E coli.     Left ureteral stone  Active Problems:    Hydronephrosis    Urinary tract infection, E coli    Leukocytosis    DATE OF DISCHARGE: 11/17/22    Diagnostic Tests/Treatments reviewed.  Follow up needed:     Follow-up Appointments     Follow-up and recommended labs and tests       Urology Follow up with Dr. Phillips on Monday 11/21 @ 8am  Hospital follow up with Gloria Laura on 12/2 @ 12:40pm       Post Discharge Medication Reconciliation: discharge medications reconciled, continue medications without change.  Medications reviewed by: by myself    Problems taking medications regularly:  None  Problems adhering to non-medication therapy:  None    Other Healthcare Providers Involved in Patient s Care:         Specialist appointment - urology, Dr. Phillips  Update since discharge: improved.   Plan of care communicated with patient  Just a friendly reminder that you appointment is   Next 5 appointments (look out 90 days)    Nov 21, 2022  8:00 AM  Return Visit with Samir Phillips MD  [facility] ([facility] [address]  [place] [address]  [phone]   Dec 02, 2022 10:40 AM  Office Visit with Gloria Laura PA-C  [facility] ([facility] ) [address]  [place] [address]  [phone]      .  We encourage you to keep this appointment.  Please remember to bring all of your pills in their bottles (including any vitamins or over the counter pills) with you to your appointment.   The patient indicates understanding of these issues and agrees with the plan of care.   Yes    Was the patient contacted within the 2 business days or  other approved timeframe?  Yes  Was the Medication reconciliation and management done since the patient was discharged? Yes    Pilar Chance RN  11/18/2022 8:27 AM

## 2022-11-21 ENCOUNTER — OFFICE VISIT (OUTPATIENT)
Dept: UROLOGY | Facility: OTHER | Age: 42
End: 2022-11-21
Attending: UROLOGY
Payer: COMMERCIAL

## 2022-11-21 ENCOUNTER — MYC MEDICAL ADVICE (OUTPATIENT)
Dept: UROLOGY | Facility: OTHER | Age: 42
End: 2022-11-21

## 2022-11-21 VITALS
DIASTOLIC BLOOD PRESSURE: 80 MMHG | OXYGEN SATURATION: 99 % | HEART RATE: 67 BPM | SYSTOLIC BLOOD PRESSURE: 132 MMHG | WEIGHT: 151.6 LBS | BODY MASS INDEX: 25.23 KG/M2 | RESPIRATION RATE: 16 BRPM

## 2022-11-21 DIAGNOSIS — N12 PYELONEPHRITIS: ICD-10-CM

## 2022-11-21 DIAGNOSIS — N20.1 LEFT URETERAL STONE: Primary | ICD-10-CM

## 2022-11-21 LAB
ALBUMIN UR-MCNC: 10 MG/DL
APPEARANCE UR: CLEAR
BACTERIA #/AREA URNS HPF: ABNORMAL /HPF
BILIRUB UR QL STRIP: NEGATIVE
COLOR UR AUTO: YELLOW
GLUCOSE UR STRIP-MCNC: NEGATIVE MG/DL
HGB UR QL STRIP: ABNORMAL
KETONES UR STRIP-MCNC: NEGATIVE MG/DL
LEUKOCYTE ESTERASE UR QL STRIP: ABNORMAL
MUCOUS THREADS #/AREA URNS LPF: PRESENT /LPF
NITRATE UR QL: NEGATIVE
PH UR STRIP: 6.5 [PH] (ref 5–9)
RBC URINE: 24 /HPF
SP GR UR STRIP: 1 (ref 1–1.03)
SQUAMOUS EPITHELIAL: 2 /HPF
UROBILINOGEN UR STRIP-MCNC: NORMAL MG/DL
WBC URINE: 7 /HPF

## 2022-11-21 PROCEDURE — 81003 URINALYSIS AUTO W/O SCOPE: CPT | Mod: ZL | Performed by: UROLOGY

## 2022-11-21 PROCEDURE — 99214 OFFICE O/P EST MOD 30 MIN: CPT | Performed by: UROLOGY

## 2022-11-21 RX ORDER — CEFTRIAXONE SODIUM 2 G/50ML
2 INJECTION, SOLUTION INTRAVENOUS
Status: CANCELLED | OUTPATIENT
Start: 2022-11-21

## 2022-11-21 RX ORDER — HYDROCODONE BITARTRATE AND ACETAMINOPHEN 5; 325 MG/1; MG/1
1-2 TABLET ORAL EVERY 4 HOURS PRN
Status: CANCELLED | OUTPATIENT
Start: 2022-11-21

## 2022-11-21 NOTE — NURSING NOTE
Chief Complaint   Patient presents with     Follow Up     Discuss Surgery     Patient presents to the clinic today for a follow up to discuss surgery    Review of Systems:    Weight loss:    No     Recent fever/chills:  No   Night sweats:   Yes  Current skin rash:  No   Recent hair loss:  No  Heat intolerance:  No   Cold intolerance:  No  Chest pain:   No   Palpitations:   No  Shortness of breath:  Yes   Wheezing:   No  Constipation:    No   Diarrhea:   No   Nausea:   No   Vomiting:   No   Kidney/side pain:  No   Back pain:   No  Frequent headaches:  Yes   Dizziness:     No  Leg swelling:   Yes   Calf pain:    No      Medication Reconciliation: completed   Meaghan Carr LPN  11/21/2022 7:52 AM

## 2022-11-21 NOTE — PROGRESS NOTES
Type of Visit  EST    Chief Complaint  Ureteral stone with pyelonephritis    HPI  Ms. Schulz is a 42 year old female who follows up after a recent hospitalization for obstructive pyelonephritis.  The patient was admitted 1 week ago for fevers and pyelonephritis.  She underwent imaging upon admission revealing an obstructing stone at the UPJ.  She underwent stent placement and remained an inpatient for 2 additional nights due to ongoing fevers.  Ultimately she was discharged on culture specific antibiotics in the form of Ceftin.  She provides urine for UA today.  She was discharged with Ceftin and continues the medication today with 3 days remaining.  Overall she feels significantly better.  She denies stent colic.      Review of Systems  I personally reviewed the ROS with the patient.    Nursing Notes:   Meaghan Carr LPN  11/21/2022  7:54 AM  Signed  Chief Complaint   Patient presents with     Follow Up     Discuss Surgery     Patient presents to the clinic today for a follow up to discuss surgery    Review of Systems:    Weight loss:    No     Recent fever/chills:  No   Night sweats:   No  Current skin rash:  No   Recent hair loss:  No  Heat intolerance:  No   Cold intolerance:  No  Chest pain:   No   Palpitations:   No  Shortness of breath:  No   Wheezing:   No  Constipation:    No   Diarrhea:   No   Nausea:   No   Vomiting:   No   Kidney/side pain:  No   Back pain:   No  Frequent headaches:  No   Dizziness:     No  Leg swelling:   No   Calf pain:    No      Medication Reconciliation: completed   Meaghan Carr LPN  11/21/2022 7:52 AM       Physical Exam  There were no vitals filed for this visit.   Constitutional: Uncomfortable writhing in pain.  Alert and cooperative   Pulmonary/Chest: Respirations are even and non-labored bilaterally, no audible wheezing  Abdominal: Soft. No distension, tenderness, masses or guarding.   Extremities: DONNY x 4, Warm. No clubbing.  No cyanosis.    Skin: Pink, warm and dry.  No  visible rashes noted.  Back:   left CVA tenderness.  - right CVA tenderness.  Genitourinary: nonpalpable bladder    Labs  Results for orders placed or performed in visit on 11/21/22   UA reflex to Microscopic     Status: Abnormal   Result Value Ref Range    Color Urine Yellow Colorless, Straw, Light Yellow, Yellow    Appearance Urine Clear Clear    Glucose Urine Negative Negative mg/dL    Bilirubin Urine Negative Negative    Ketones Urine Negative Negative mg/dL    Specific Gravity Urine 1.004 1.000 - 1.030    Blood Urine Large (A) Negative    pH Urine 6.5 5.0 - 9.0    Protein Albumin Urine 10 (A) Negative mg/dL    Urobilinogen Urine Normal Normal, 2.0 mg/dL    Nitrite Urine Negative Negative    Leukocyte Esterase Urine Moderate (A) Negative    Bacteria Urine Few (A) None Seen /HPF    RBC Urine 24 (H) <=2 /HPF    WBC Urine 7 (H) <=5 /HPF    Squamous Epithelials Urine 2 (H) <=1 /HPF    Mucus Urine Present (A) None Seen /LPF     Imaging  I personally reviewed and interpreted the images and report.  CT a/p   11/15/2022  Impression: There is a proximal obstructing left ureteral calculus at  the ureteropelvic junction. The calculus measures 7 x 6 x 9 mm. There  is hydronephrosis and perinephric edema.    Assessment  Ms. Schulz is a 42 year old female who follows up after a recent hospitalization for obstructive pyelonephritis.  Reviewed the past notes, labs and imaging to conduct visit.    Discussed the treatment options for the ureteral stone including ureteroscopy with laser lithotripsy.    After explaining the risks, benefits, and alternatives a decision was made to proceed with ureteroscopy/laser lithotripsy.    The patient was explained the specific risks of bleeding, pain, infection, and ureteral injury.    In addition, the patient was told a stent may need to be placed which could result in urinary frequency, urgency, dysuria, and flank pain with voiding.    It would require an office based procedure to remove  "it at a later date.    Finally, the patient was told if the stone was very impacted, that we would place a stent and return at a later date to treat the stone.      Plan  The patient was scheduled and consented for \"left ureteroscopy with holmium laser lithotripsy and stent placement\" in the OR (LMA general anesthesia).  "

## 2022-11-23 ENCOUNTER — TELEPHONE (OUTPATIENT)
Dept: UROLOGY | Facility: OTHER | Age: 42
End: 2022-11-23

## 2022-11-23 NOTE — TELEPHONE ENCOUNTER
Patient is scheduled for surgery on 11/29/22 with Dr. Phillips. She has a question regarding taking tylenol for pain. Please contact patient.    Natalie Rasmussen on 11/23/2022 at 11:52 AM

## 2022-11-28 ENCOUNTER — ANESTHESIA EVENT (OUTPATIENT)
Dept: SURGERY | Facility: OTHER | Age: 42
End: 2022-11-28
Payer: COMMERCIAL

## 2022-11-29 ENCOUNTER — HOSPITAL ENCOUNTER (OUTPATIENT)
Dept: GENERAL RADIOLOGY | Facility: OTHER | Age: 42
Discharge: HOME | End: 2022-11-29
Attending: UROLOGY | Admitting: UROLOGY
Payer: COMMERCIAL

## 2022-11-29 ENCOUNTER — ANESTHESIA (OUTPATIENT)
Dept: SURGERY | Facility: OTHER | Age: 42
End: 2022-11-29
Payer: COMMERCIAL

## 2022-11-29 ENCOUNTER — HOSPITAL ENCOUNTER (OUTPATIENT)
Facility: OTHER | Age: 42
Discharge: HOME | End: 2022-11-29
Attending: UROLOGY | Admitting: UROLOGY
Payer: COMMERCIAL

## 2022-11-29 VITALS
TEMPERATURE: 97.5 F | OXYGEN SATURATION: 95 % | HEART RATE: 50 BPM | WEIGHT: 151 LBS | DIASTOLIC BLOOD PRESSURE: 65 MMHG | BODY MASS INDEX: 25.16 KG/M2 | HEIGHT: 65 IN | SYSTOLIC BLOOD PRESSURE: 97 MMHG | RESPIRATION RATE: 16 BRPM

## 2022-11-29 DIAGNOSIS — N20.1 LEFT URETERAL STONE: Primary | ICD-10-CM

## 2022-11-29 DIAGNOSIS — N20.1 LEFT URETERAL STONE: ICD-10-CM

## 2022-11-29 PROCEDURE — 999N000141 HC STATISTIC PRE-PROCEDURE NURSING ASSESSMENT: Performed by: UROLOGY

## 2022-11-29 PROCEDURE — C1894 INTRO/SHEATH, NON-LASER: HCPCS | Performed by: UROLOGY

## 2022-11-29 PROCEDURE — 52356 CYSTO/URETERO W/LITHOTRIPSY: CPT | Performed by: NURSE ANESTHETIST, CERTIFIED REGISTERED

## 2022-11-29 PROCEDURE — 52356 CYSTO/URETERO W/LITHOTRIPSY: CPT | Performed by: UROLOGY

## 2022-11-29 PROCEDURE — 370N000017 HC ANESTHESIA TECHNICAL FEE, PER MIN: Performed by: UROLOGY

## 2022-11-29 PROCEDURE — 250N000009 HC RX 250: Performed by: NURSE ANESTHETIST, CERTIFIED REGISTERED

## 2022-11-29 PROCEDURE — 250N000011 HC RX IP 250 OP 636: Performed by: UROLOGY

## 2022-11-29 PROCEDURE — 272N000002 HC OR SUPPLY OTHER OPNP: Performed by: UROLOGY

## 2022-11-29 PROCEDURE — 52332 CYSTOSCOPY AND TREATMENT: CPT | Mod: XU | Performed by: UROLOGY

## 2022-11-29 PROCEDURE — 272N000001 HC OR GENERAL SUPPLY STERILE: Performed by: UROLOGY

## 2022-11-29 PROCEDURE — C1758 CATHETER, URETERAL: HCPCS | Performed by: UROLOGY

## 2022-11-29 PROCEDURE — 258N000003 HC RX IP 258 OP 636: Performed by: NURSE ANESTHETIST, CERTIFIED REGISTERED

## 2022-11-29 PROCEDURE — 999N000180 XR SURGERY CARM FLUORO LESS THAN 5 MIN: Mod: TC

## 2022-11-29 PROCEDURE — C2617 STENT, NON-COR, TEM W/O DEL: HCPCS | Performed by: UROLOGY

## 2022-11-29 PROCEDURE — C1769 GUIDE WIRE: HCPCS | Performed by: UROLOGY

## 2022-11-29 PROCEDURE — 82365 CALCULUS SPECTROSCOPY: CPT | Performed by: UROLOGY

## 2022-11-29 PROCEDURE — 710N000012 HC RECOVERY PHASE 2, PER MINUTE: Performed by: UROLOGY

## 2022-11-29 PROCEDURE — 250N000011 HC RX IP 250 OP 636: Performed by: NURSE ANESTHETIST, CERTIFIED REGISTERED

## 2022-11-29 PROCEDURE — 74420 UROGRAPHY RTRGR +-KUB: CPT | Mod: 26 | Performed by: UROLOGY

## 2022-11-29 PROCEDURE — 258N000001 HC RX 258: Performed by: UROLOGY

## 2022-11-29 PROCEDURE — 710N000010 HC RECOVERY PHASE 1, LEVEL 2, PER MIN: Performed by: UROLOGY

## 2022-11-29 PROCEDURE — 360N000083 HC SURGERY LEVEL 3 W/ FLUORO, PER MIN: Performed by: UROLOGY

## 2022-11-29 DEVICE — URETERAL STENT
Type: IMPLANTABLE DEVICE | Site: URETER | Status: FUNCTIONAL
Brand: CONTOUR™

## 2022-11-29 RX ORDER — FENTANYL CITRATE 50 UG/ML
25 INJECTION, SOLUTION INTRAMUSCULAR; INTRAVENOUS EVERY 5 MIN PRN
Status: DISCONTINUED | OUTPATIENT
Start: 2022-11-29 | End: 2022-11-29 | Stop reason: HOSPADM

## 2022-11-29 RX ORDER — NALOXONE HYDROCHLORIDE 0.4 MG/ML
0.4 INJECTION, SOLUTION INTRAMUSCULAR; INTRAVENOUS; SUBCUTANEOUS
Status: DISCONTINUED | OUTPATIENT
Start: 2022-11-29 | End: 2022-11-29 | Stop reason: HOSPADM

## 2022-11-29 RX ORDER — SODIUM CHLORIDE 9 MG/ML
INJECTION, SOLUTION INTRAVENOUS CONTINUOUS
Status: DISCONTINUED | OUTPATIENT
Start: 2022-11-29 | End: 2022-11-29 | Stop reason: HOSPADM

## 2022-11-29 RX ORDER — PROPOFOL 10 MG/ML
INJECTION, EMULSION INTRAVENOUS CONTINUOUS PRN
Status: DISCONTINUED | OUTPATIENT
Start: 2022-11-29 | End: 2022-11-29

## 2022-11-29 RX ORDER — ONDANSETRON 2 MG/ML
INJECTION INTRAMUSCULAR; INTRAVENOUS PRN
Status: DISCONTINUED | OUTPATIENT
Start: 2022-11-29 | End: 2022-11-29

## 2022-11-29 RX ORDER — ONDANSETRON 2 MG/ML
4 INJECTION INTRAMUSCULAR; INTRAVENOUS EVERY 30 MIN PRN
Status: DISCONTINUED | OUTPATIENT
Start: 2022-11-29 | End: 2022-11-29 | Stop reason: HOSPADM

## 2022-11-29 RX ORDER — FENTANYL CITRATE 50 UG/ML
INJECTION, SOLUTION INTRAMUSCULAR; INTRAVENOUS PRN
Status: DISCONTINUED | OUTPATIENT
Start: 2022-11-29 | End: 2022-11-29

## 2022-11-29 RX ORDER — LIDOCAINE HYDROCHLORIDE 20 MG/ML
INJECTION, SOLUTION INFILTRATION; PERINEURAL PRN
Status: DISCONTINUED | OUTPATIENT
Start: 2022-11-29 | End: 2022-11-29

## 2022-11-29 RX ORDER — HYDROCODONE BITARTRATE AND ACETAMINOPHEN 5; 325 MG/1; MG/1
1-2 TABLET ORAL EVERY 4 HOURS PRN
Status: DISCONTINUED | OUTPATIENT
Start: 2022-11-29 | End: 2022-11-29 | Stop reason: HOSPADM

## 2022-11-29 RX ORDER — HYDROCODONE BITARTRATE AND ACETAMINOPHEN 5; 325 MG/1; MG/1
1-2 TABLET ORAL EVERY 6 HOURS PRN
Qty: 20 TABLET | Refills: 0 | Status: SHIPPED | OUTPATIENT
Start: 2022-11-29 | End: 2023-02-10

## 2022-11-29 RX ORDER — PROPOFOL 10 MG/ML
INJECTION, EMULSION INTRAVENOUS PRN
Status: DISCONTINUED | OUTPATIENT
Start: 2022-11-29 | End: 2022-11-29

## 2022-11-29 RX ORDER — KETOROLAC TROMETHAMINE 30 MG/ML
INJECTION, SOLUTION INTRAMUSCULAR; INTRAVENOUS PRN
Status: DISCONTINUED | OUTPATIENT
Start: 2022-11-29 | End: 2022-11-29

## 2022-11-29 RX ORDER — HYDROMORPHONE HYDROCHLORIDE 1 MG/ML
0.4 INJECTION, SOLUTION INTRAMUSCULAR; INTRAVENOUS; SUBCUTANEOUS EVERY 5 MIN PRN
Status: DISCONTINUED | OUTPATIENT
Start: 2022-11-29 | End: 2022-11-29 | Stop reason: HOSPADM

## 2022-11-29 RX ORDER — IOPAMIDOL 755 MG/ML
INJECTION, SOLUTION INTRAVASCULAR PRN
Status: DISCONTINUED | OUTPATIENT
Start: 2022-11-29 | End: 2022-11-29 | Stop reason: HOSPADM

## 2022-11-29 RX ORDER — NALOXONE HYDROCHLORIDE 0.4 MG/ML
0.2 INJECTION, SOLUTION INTRAMUSCULAR; INTRAVENOUS; SUBCUTANEOUS
Status: DISCONTINUED | OUTPATIENT
Start: 2022-11-29 | End: 2022-11-29 | Stop reason: HOSPADM

## 2022-11-29 RX ORDER — SCOLOPAMINE TRANSDERMAL SYSTEM 1 MG/1
1 PATCH, EXTENDED RELEASE TRANSDERMAL
Status: DISCONTINUED | OUTPATIENT
Start: 2022-11-29 | End: 2022-11-29 | Stop reason: HOSPADM

## 2022-11-29 RX ORDER — FENTANYL CITRATE 50 UG/ML
50 INJECTION, SOLUTION INTRAMUSCULAR; INTRAVENOUS EVERY 5 MIN PRN
Status: DISCONTINUED | OUTPATIENT
Start: 2022-11-29 | End: 2022-11-29 | Stop reason: HOSPADM

## 2022-11-29 RX ORDER — CEFTRIAXONE SODIUM 2 G/50ML
2 INJECTION, SOLUTION INTRAVENOUS
Status: COMPLETED | OUTPATIENT
Start: 2022-11-29 | End: 2022-11-29

## 2022-11-29 RX ORDER — ONDANSETRON 4 MG/1
4 TABLET, ORALLY DISINTEGRATING ORAL EVERY 30 MIN PRN
Status: DISCONTINUED | OUTPATIENT
Start: 2022-11-29 | End: 2022-11-29 | Stop reason: HOSPADM

## 2022-11-29 RX ORDER — FENTANYL CITRATE 50 UG/ML
50 INJECTION, SOLUTION INTRAMUSCULAR; INTRAVENOUS
Status: DISCONTINUED | OUTPATIENT
Start: 2022-11-29 | End: 2022-11-29 | Stop reason: HOSPADM

## 2022-11-29 RX ORDER — HYDROMORPHONE HYDROCHLORIDE 1 MG/ML
0.2 INJECTION, SOLUTION INTRAMUSCULAR; INTRAVENOUS; SUBCUTANEOUS EVERY 5 MIN PRN
Status: DISCONTINUED | OUTPATIENT
Start: 2022-11-29 | End: 2022-11-29 | Stop reason: HOSPADM

## 2022-11-29 RX ORDER — ACYCLOVIR 200 MG/1
CAPSULE ORAL PRN
Status: DISCONTINUED | OUTPATIENT
Start: 2022-11-29 | End: 2022-11-29 | Stop reason: HOSPADM

## 2022-11-29 RX ORDER — MEPERIDINE HYDROCHLORIDE 50 MG/ML
12.5 INJECTION INTRAMUSCULAR; INTRAVENOUS; SUBCUTANEOUS
Status: DISCONTINUED | OUTPATIENT
Start: 2022-11-29 | End: 2022-11-29 | Stop reason: HOSPADM

## 2022-11-29 RX ORDER — LIDOCAINE 40 MG/G
CREAM TOPICAL
Status: DISCONTINUED | OUTPATIENT
Start: 2022-11-29 | End: 2022-11-29 | Stop reason: HOSPADM

## 2022-11-29 RX ORDER — DEXAMETHASONE SODIUM PHOSPHATE 4 MG/ML
INJECTION, SOLUTION INTRA-ARTICULAR; INTRALESIONAL; INTRAMUSCULAR; INTRAVENOUS; SOFT TISSUE PRN
Status: DISCONTINUED | OUTPATIENT
Start: 2022-11-29 | End: 2022-11-29

## 2022-11-29 RX ADMIN — KETOROLAC TROMETHAMINE 30 MG: 30 INJECTION, SOLUTION INTRAMUSCULAR at 11:24

## 2022-11-29 RX ADMIN — FENTANYL CITRATE 25 MCG: 50 INJECTION, SOLUTION INTRAMUSCULAR; INTRAVENOUS at 11:50

## 2022-11-29 RX ADMIN — ONDANSETRON HYDROCHLORIDE 4 MG: 2 SOLUTION INTRAMUSCULAR; INTRAVENOUS at 11:21

## 2022-11-29 RX ADMIN — PROPOFOL 200 MG: 10 INJECTION, EMULSION INTRAVENOUS at 11:11

## 2022-11-29 RX ADMIN — SODIUM CHLORIDE: 9 INJECTION, SOLUTION INTRAVENOUS at 10:35

## 2022-11-29 RX ADMIN — MIDAZOLAM HYDROCHLORIDE 2 MG: 1 INJECTION, SOLUTION INTRAMUSCULAR; INTRAVENOUS at 11:06

## 2022-11-29 RX ADMIN — DEXAMETHASONE SODIUM PHOSPHATE 8 MG: 4 INJECTION, SOLUTION INTRA-ARTICULAR; INTRALESIONAL; INTRAMUSCULAR; INTRAVENOUS; SOFT TISSUE at 11:21

## 2022-11-29 RX ADMIN — CEFTRIAXONE SODIUM 2 G: 2 INJECTION, SOLUTION INTRAVENOUS at 10:56

## 2022-11-29 RX ADMIN — SCOPALAMINE 1 PATCH: 1 PATCH, EXTENDED RELEASE TRANSDERMAL at 10:28

## 2022-11-29 RX ADMIN — PROPOFOL 200 MCG/KG/MIN: 10 INJECTION, EMULSION INTRAVENOUS at 11:11

## 2022-11-29 RX ADMIN — FENTANYL CITRATE 25 MCG: 50 INJECTION, SOLUTION INTRAMUSCULAR; INTRAVENOUS at 11:15

## 2022-11-29 RX ADMIN — LIDOCAINE HYDROCHLORIDE 60 MG: 20 INJECTION, SOLUTION INFILTRATION; PERINEURAL at 11:11

## 2022-11-29 ASSESSMENT — LIFESTYLE VARIABLES: TOBACCO_USE: 1

## 2022-11-29 ASSESSMENT — ACTIVITIES OF DAILY LIVING (ADL)
ADLS_ACUITY_SCORE: 35
ADLS_ACUITY_SCORE: 35

## 2022-11-29 NOTE — ANESTHESIA PROCEDURE NOTES
Airway         Procedure Start/Stop Times: 11/29/2022 11:12 AM and 11/29/2022 11:12 AM  Staff -        CRNA: Beto Scott APRN CRNA       Performed By: CRNAIndications and Patient Condition       Indications for airway management: mando-procedural       Induction type:intravenous       Mask difficulty assessment: 1 - vent by mask    Final Airway Details       Final airway type: supraglottic airway    Supraglottic Airway Details        Type: LMA       Brand: Ambu AuraGain       LMA size: 4    Post intubation assessment        Placement verified by: capnometry        Number of attempts at approach: 1       Secured with: silk tape       Ease of procedure: easy       Dentition: Intact and Unchanged    Medication(s) Administered   Medication Administration Time: 11/29/2022 11:12 AM

## 2022-11-29 NOTE — ANESTHESIA PREPROCEDURE EVALUATION
Anesthesia Pre-Procedure Evaluation    Patient: Abril Schulz   MRN: [id number] : 1980        Procedure : Procedure(s):  Left ureteroscopy with laser lithotripsy and stent exchange          Past Medical History:   Diagnosis Date     History of cervical dysplasia           Past Surgical History:   Procedure Laterality Date     ARTHROSCOPY KNEE      ,right     CONIZATION LEEP      2002     CYSTOSCOPY, RETROGRADES, INSERT STENT URETER(S), COMBINED Left 11/15/2022    Procedure: Left retrograde pyelogram and stent placement;  Surgeon: Samir Phillips MD;  Location: [facility] OR      No Known Allergies   Social History     Tobacco Use     Smoking status: Every Day     Packs/day: 1.00     Years: 16.00     Pack years: 16.00     Types: Cigarettes     Smokeless tobacco: Never     Tobacco comments:     Quit smoking: has been trying   Substance Use Topics     Alcohol use: Yes     Alcohol/week: 0.0 standard drinks     Comment: Alcoholic Drinks/day: occasionally, maybe four per month      Wt Readings from Last 1 Encounters:   22 68.8 kg (151 lb 9.6 oz)        Anesthesia Evaluation   Pt has had prior anesthetic.     History of anesthetic complications  - PONV.      ROS/MED HX  ENT/Pulmonary:     (+) tobacco use, Current use,     Neurologic:  - neg neurologic ROS     Cardiovascular:  - neg cardiovascular ROS     METS/Exercise Tolerance: >4 METS    Hematologic:  - neg hematologic  ROS     Musculoskeletal:  - neg musculoskeletal ROS     GI/Hepatic:  - neg GI/hepatic ROS     Renal/Genitourinary:     (+) renal disease, Nephrolithiasis ,     Endo:  - neg endo ROS     Psychiatric/Substance Use:  - neg psychiatric ROS     Infectious Disease:       Malignancy:  - neg malignancy ROS     Other:            Physical Exam    Airway  airway exam normal      Mallampati: II   TM distance: > 3 FB   Neck ROM: full   Mouth opening: > 3 cm    Respiratory Devices and Support         Dental  no notable dental history          Cardiovascular   cardiovascular exam normal       Rhythm and rate: regular and normal     Pulmonary   pulmonary exam normal        breath sounds clear to auscultation           OUTSIDE LABS:  CBC:   Lab Results   Component Value Date    WBC 11.4 (H) 11/17/2022    WBC 23.3 (H) 11/16/2022    HGB 11.3 (L) 11/17/2022    HGB 11.3 (L) 11/16/2022    HCT 33.3 (L) 11/17/2022    HCT 33.8 (L) 11/16/2022     11/17/2022     11/16/2022     BMP:   Lab Results   Component Value Date     11/17/2022     11/16/2022    POTASSIUM 3.8 11/17/2022    POTASSIUM 4.1 11/16/2022    CHLORIDE 108 (H) 11/17/2022    CHLORIDE 111 (H) 11/16/2022    CO2 20 (L) 11/17/2022    CO2 20 (L) 11/16/2022    BUN 9.5 11/17/2022    BUN 13.2 11/16/2022    CR 0.89 11/17/2022    CR 0.80 11/16/2022     (H) 11/17/2022     (H) 11/16/2022     COAGS: No results found for: PTT, INR, FIBR  POC:   Lab Results   Component Value Date    HCG Negative 11/15/2022     HEPATIC: No results found for: ALBUMIN, PROTTOTAL, ALT, AST, GGT, ALKPHOS, BILITOTAL, BILIDIRECT, ERICA  OTHER:   Lab Results   Component Value Date    LACT 1.4 11/16/2022    ANH 7.8 (L) 11/17/2022    CRP <3.00 11/15/2022       Anesthesia Plan    ASA Status:  2   NPO Status:  NPO Appropriate    Anesthesia Type: General.     - Airway: LMA   Induction: Intravenous.   Maintenance: TIVA.        Consents    Anesthesia Plan(s) and associated risks, benefits, and realistic alternatives discussed. Questions answered and patient/representative(s) expressed understanding.     - Discussed: Risks, Benefits and Alternatives for BOTH SEDATION and the PROCEDURE were discussed     - Discussed with:  Patient, Parent (Mother and/or Father)      - Extended Intubation/Ventilatory Support Discussed: No.      - Patient is DNR/DNI Status: No    Use of blood products discussed: No .     Postoperative Care    Pain management: IV analgesics, Multi-modal analgesia.   PONV prophylaxis: Ondansetron  (or other 5HT-3), Dexamethasone or Solumedrol     Comments:    Other Comments: Risks, benefits and alternatives discussed and would like to proceed.             DELORES Bauer CRNA

## 2022-11-29 NOTE — ANESTHESIA POSTPROCEDURE EVALUATION
Patient: Abril Schulz    Procedure: Procedure(s):  Left ureteroscopy with laser lithotripsy and stent exchange       Anesthesia Type:  General    Note:  Disposition: Outpatient   Postop Pain Control: Uneventful            Sign Out: Well controlled pain   PONV: No   Neuro/Psych: Uneventful            Sign Out: Acceptable/Baseline neuro status   Airway/Respiratory: Uneventful            Sign Out: Acceptable/Baseline resp. status   CV/Hemodynamics: Uneventful            Sign Out: Acceptable CV status; No obvious hypovolemia; No obvious fluid overload   Other NRE: NONE   DID A NON-ROUTINE EVENT OCCUR? No           Last vitals:  Vitals Value Taken Time   BP 98/63 11/29/22 1220   Temp 97.8  F (36.6  C) 11/29/22 1205   Pulse 49 11/29/22 1220   Resp 8 11/29/22 1220   SpO2 99 % 11/29/22 1220   Vitals shown include unvalidated device data.    Electronically Signed By: DELORES BOSTON CRNA  November 29, 2022  2:29 PM

## 2022-11-29 NOTE — OR NURSING
PACU Respiratory Event Documentation     1) Episodes of Apnea greater than or equal to 10 seconds: no    2) Bradypnea - less than 8 breaths per minute: no    3) Pain score on 0 to 10 scale: 0    4) Pain-sedation mismatch (yes or no): no    5) Repeated 02 desaturation less than 90% (yes or no): no    Anesthesia notified? (yes or no): no    Any of the above events occuring repeatedly in separate 30 minute intervals may be considered recurrent PACU respiratory events.   Event Note

## 2022-11-29 NOTE — ANESTHESIA CARE TRANSFER NOTE
Patient: Abril Schulz    Procedure: Procedure(s):  Left ureteroscopy with laser lithotripsy and stent exchange       Diagnosis: Left ureteral stone [N20.1]  Diagnosis Additional Information: No value filed.    Anesthesia Type:   General     Note:    Oropharynx: oropharynx clear of all foreign objects and spontaneously breathing  Level of Consciousness: drowsy  Oxygen Supplementation: face mask  Level of Supplemental Oxygen (L/min / FiO2): 6  Independent Airway: airway patency satisfactory and stable  Dentition: dentition unchanged  Vital Signs Stable: post-procedure vital signs reviewed and stable  Report to RN Given: handoff report given  Patient transferred to: PACU    Handoff Report: Identifed the Patient, Identified the Reponsible Provider, Reviewed the pertinent medical history, Discussed the surgical course, Reviewed Intra-OP anesthesia mangement and issues during anesthesia, Set expectations for post-procedure period and Allowed opportunity for questions and acknowledgement of understanding      Vitals:  Vitals Value Taken Time   BP     Temp     Pulse     Resp 20 11/29/22 1157   SpO2 99 % 11/29/22 1157   Vitals shown include unvalidated device data.    Electronically Signed By: DELORES BOSTON CRNA  November 29, 2022  11:58 AM

## 2022-11-29 NOTE — OP NOTE
Preoperative diagnosis  Left ureteral and kidney stone    Postoperative diagnosis  Left ureteral and kidney stone    Procedure performed  Left ureteroscopy with holmium-YAG laser lithotripsy and basket extraction of stone fragments  Cystoscopy with left retrograde pyelogram and ureteral stent placement  Interpretation of retrograde    Surgeon and Assistants (if any)  Surgeon(s):  Samir Phillips MD  Circulator: Rosy Rojas RN  Relief Scrub: Ivan Stafford  Scrub Person: Zahra Bello  X-Ray Technologist: Ashley Hermosillo  First Assistant: Mariya Rojas RN    Specimen(s)  Yes  Stone fragments for biochemical analysis    (EBL) Estimated blood loss (ml)  0    Anesthesia  General    Complications  None    Findings  Cystoscopy revealed no tumors, stones or other mucosal abnormalities.  Retrograde pyelogram revealed a delicate system with identification of the stones seen on imaging.    Indications  42 year old female agreed to undergo the above named procedure after discussion of the alternatives, risks and benefits.    Informed consent was obtained.      Procedure  The patient was taken to the operating room and placed supine on the operating table.  Pre-operative antibiotics were administered.  Bilateral lower extremity SCDs were placed.  After induction of general anesthesia the patient was positioned in dorsal lithotomy, prepped and draped in a sterile fashion.  A time-out was performed.      I passed a 14 Sinhala flexible cystoscope carefully via urethra into the bladder.  The left ureteral orifice was identified and a Sensor wire was passed retrograde to the level of the kidney and confirmed by fluoroscopy.  The flexible scope was off-loaded and the bladder emptied with a straight catheter.  An 8-10 coaxial dilator was passed without difficulty and then removed.  The MR6A semirigid ureteroscope was passed carefully along the Sensor wire through the urethra and into the distal ureter.  The stone  was encountered and fragmented with a thulium laser fiber.  The fragments were basket extracted however some fragments retropulsed into kidney.  I performed a retrograde pyelogram through the semirigid scope then removed the scope over a Superstff wire which was passed to the level of the kidney confirmed by fluoroscopy.  The ureteroscope was withdrawn.      A 11-13, 36-cm access sheath was advanced over the super-stiff wire to level of the UPJ under direct fluoroscopic guidance. The inner stylet and super-stiff wire were removed.  The kidney was entered with the Olympus URF-P6 flexible ureteroscope.  The kidney stone was identified and fragmented with a thulium laser fiber. The fragments were basket extracted. At the completion of the procedure, all clinically significant fragments were removed and only dust-like fragments remained.  The access sheath was removed under direct vision.  Ureteral edema but no obvious obstruction was present.  A 5-Lithuanian catheter was passed over the safety wire and the wire withdrawn.   Contrast was injected into the renal pelvis via the 5-Lithuanian open-ended catheter.  A super-stiff wire was placed and confirmed by fluoroscopy.  A 6 x 24 Lithuanian stent was positioned with the upper end in the upper pole and the lower in the bladder confirmed by fluoroscopy. The bladder was emptied and the procedure was complete. The patient tolerated the procedure well and was stable throughout    Plan  Follow up in clinic for stent pull in the next week or so.

## 2022-11-29 NOTE — INTERVAL H&P NOTE
"I have reviewed the surgical (or preoperative) H&P that is linked to this encounter, and examined the patient. There are no significant changes    Clinical Conditions Present on Arrival:  Clinically Significant Risk Factors Present on Admission                    # Overweight: Estimated body mass index is 25.23 kg/m  as calculated from the following:    Height as of 11/15/22: 1.651 m (5' 5\").    Weight as of 11/21/22: 68.8 kg (151 lb 9.6 oz).       "
supervision

## 2022-11-29 NOTE — DISCHARGE INSTRUCTIONS
[facility]  Adult Discharge Orders & Instructions      For 24 hours after surgery:  Get plenty of rest.  A responsible adult must stay with you for at least 24 hours after you leave the hospital.   You may feel lightheaded.  IF so, sit for a few minutes before standing.  Have someone help you get up.   You may have a slight fever. Call the doctor if your fever is over 101 F (38.3 C) (taken under the tongue) or lasts longer than 24 hours.  You may have a dry mouth, a sore throat, muscle aches or trouble sleeping.  These should go away after 24 hours.  Do not make important or legal decisions.  6.   Do not drive or use heavy equipment.  If you have weakness or tingling, don't drive or use heavy equipment until this feeling goes away.                                                                                                                                                                         To contact a doctor, call    [phone]______________

## 2022-12-02 LAB
APPEARANCE STONE: NORMAL
COMPN STONE: NORMAL
SPECIMEN WT: 125 MG

## 2022-12-05 ENCOUNTER — OFFICE VISIT (OUTPATIENT)
Dept: UROLOGY | Facility: OTHER | Age: 42
End: 2022-12-05
Attending: UROLOGY
Payer: COMMERCIAL

## 2022-12-05 VITALS — HEART RATE: 103 BPM | RESPIRATION RATE: 16 BRPM | BODY MASS INDEX: 22.73 KG/M2 | TEMPERATURE: 97.5 F | WEIGHT: 136.6 LBS

## 2022-12-05 DIAGNOSIS — N20.1 LEFT URETERAL STONE: Primary | ICD-10-CM

## 2022-12-05 PROCEDURE — 52310 CYSTOSCOPY AND TREATMENT: CPT | Performed by: UROLOGY

## 2022-12-05 PROCEDURE — 250N000013 HC RX MED GY IP 250 OP 250 PS 637: Performed by: UROLOGY

## 2022-12-05 RX ORDER — CEFUROXIME AXETIL 250 MG/1
500 TABLET ORAL ONCE
Status: COMPLETED | OUTPATIENT
Start: 2022-12-05 | End: 2022-12-05

## 2022-12-05 RX ADMIN — CEFUROXIME AXETIL 500 MG: 250 TABLET ORAL at 08:59

## 2022-12-05 ASSESSMENT — PAIN SCALES - GENERAL: PAINLEVEL: NO PAIN (0)

## 2022-12-05 NOTE — NURSING NOTE
Patient positioned in frog leg position prior to Samir Phillips MD prepping patient with chlorhexidine gluconate cleanser.    [place] Protocol    A. Pre-procedure verification complete Yes   1-relevant information / documentation available, reviewed and properly matched to the patient; 2-consent accurate and complete, 3-equipment and supplies available    B. Site marking complete N/A  Site marked if not in continuous attendance with patient    C. TIME OUT completed Yes  Time Out was conducted just prior to starting procedure to verify the eight required elements: 1-patient identity, 2-consent accurate and complete, 3-position, 4-correct side/site marked (if applicable), 5-procedure, 6-relevant images / results properly labeled and displayed (if applicable), 7-antibiotics / irrigation fluids (if applicable), 8-safety precautions.    After procedure perineum area rinsed. Discharge instructions reviewed with patient. Patient verbalized understanding of discharge instructions and discharged ambulatory.  Jesica Alberto LPN..................12/5/2022  8:54 AM

## 2022-12-05 NOTE — PROGRESS NOTES
Preoperative diagnosis  Nephrolithiasis    Postoperative diagnosis  Nephrolithiasis    Procedure  Flexible cystourethroscopy with stent removal    Surgeon  Samir Phillips MD    Anesthesia  2% lidocaine jelly intraurethrally    Complications  None    Indications  42 year old female who is status post ureteroscopy with holmium laser lithotripsy who presents for stent removal.    Procedure  The patient was given one dose of antibiotics. The patient was placed in supine position and was prepped and draped in sterile fashion.  2% lidocaine jelly was bluntly injected per urethra without difficulty. I passed the 14 French flexible cystoscope through the urethra and into the bladder.  With the aid of a stent grasper I grasped and removed the stent in its entirety.  The patient tolerated the procedure well.    Plan  Discussed generic dietary approach to stone prevention- provided hand out  F/u as needed

## 2022-12-05 NOTE — PATIENT INSTRUCTIONS
Calculi composed primarily of:   50% calcium oxalate monohydrate,   20% calcium oxalate dihydrate, and   30% calcium phosphate (hydroxy- and carbonate- apatite).    Home Care after Cystoscopy  Follow these guidelines for your care after your procedure.    Activity  No limitations    Bathing or showering  No limitations    Symptoms  You may notice some burning with urination but this usually resolves after 1-2 days.  You may also notice small amounts of blood in your urine.  Please increase water intake for the next few days to help with these symptoms.    Contacts  General Questions: [phone]  Appointments:  [phone]  Emergencies:  [phone]    When to call the clinic  If you develop any of the following symptoms please call the clinic immediately.  If the clinic is closed please be seen at an urgent care clinic or the Emergency Department.  - Burning with urination that worsens after 2 days  - Unable to urinate causing severe pelvic pain  - Fevers of greater than 101 degrees F  - Flank pain that is not responding to pain medication    Follow up  Please follow up as discussed at the appointment.    Please follow the below generic recommendations to help prevent stones.    If you are interested in undergoing a work up (including blood and urine tests) to determine your patient specific factors for why you form stones and ways to specifically prevent stones in the future, ask Dr Phillips if he hasn't already discussed this with you.      Fluids (Increase)  Please increase your fluid intake   Recommend about ten 10-ounce glasses of fluid per day (avoid dark bruce).  Please try and keep your urine clear or pale yellow.    If it is dark yellow or cloudy it is concentrated and you need to drink more fluid.  Try drinking a tall glass of water prior to every snack/meal.    Citrate (Increase)  Citrate prevents stone formation and is naturally found in urine.    It can be increased by adding concentrated lemon juice (4  ounces daily) and/or drinking diluted orange juice (50/50 with water).    Both MinuteMaid Light and Crystal Light also contain citrate and can be helpful for stone prevention.    If you plan to drink sodas, I would recommend Diet/Sunkist and/or Diet/7UP as they contain the most citrate (which is good) compared to the bruce such as Coke/Pepsi.      Salt (Decrease)  Try to limit salt intake.  Total daily sodium intake should be less than 1500mg.  If you use salt with cooking don't add any additional salt at the table.    Protein  Limit protein intake to small to moderate portions.  For instance, a steak should be no larger than about the size of a deck of cards.  High protein intake leads to stone formation.

## 2023-01-30 ENCOUNTER — MYC REFILL (OUTPATIENT)
Dept: FAMILY MEDICINE | Facility: OTHER | Age: 43
End: 2023-01-30
Payer: COMMERCIAL

## 2023-01-30 DIAGNOSIS — F41.9 ANXIETY: ICD-10-CM

## 2023-01-30 NOTE — LETTER
February 3, 2023      Abril Jackjared  [address] [place] [address]        Dear Abril,       This letter is to remind you that you are due for your annual exam with Gloria Laura. Your last comprehensive visit was more than 12 months ago.        Please call the clinic at [phone] to schedule your appointment.      If you are no longer seeing Gloria Laura for primary care, please call to let us know.       Thank you for choosing [facility] and [facility] for your health care needs.    Sincerely,    Refill JACK  [facility]               decreased strength

## 2023-02-03 RX ORDER — ALPRAZOLAM 0.5 MG
0.5 TABLET ORAL 2 TIMES DAILY PRN
Qty: 10 TABLET | Refills: 0 | OUTPATIENT
Start: 2023-02-03

## 2023-02-03 ASSESSMENT — ENCOUNTER SYMPTOMS
SORE THROAT: 0
HEMATOCHEZIA: 0
ABDOMINAL PAIN: 0
MYALGIAS: 0
DIARRHEA: 0
FREQUENCY: 0
DIZZINESS: 0
NAUSEA: 0
ARTHRALGIAS: 0
PALPITATIONS: 0
HEADACHES: 0
SHORTNESS OF BREATH: 0
NERVOUS/ANXIOUS: 0
HEARTBURN: 0
JOINT SWELLING: 0
HEMATURIA: 0
EYE PAIN: 0
CONSTIPATION: 0
CHILLS: 0
PARESTHESIAS: 0
WEAKNESS: 0
FEVER: 0
COUGH: 0
DYSURIA: 0
BREAST MASS: 0

## 2023-02-03 NOTE — TELEPHONE ENCOUNTER
Patient needs to be seen prior to future refills of alprazolam.  Gloria Laura PA-C.......... 2/3/2023 2:28 PM

## 2023-02-10 ENCOUNTER — OFFICE VISIT (OUTPATIENT)
Dept: FAMILY MEDICINE | Facility: OTHER | Age: 43
End: 2023-02-10
Attending: PHYSICIAN ASSISTANT
Payer: COMMERCIAL

## 2023-02-10 VITALS
WEIGHT: 141.8 LBS | HEART RATE: 74 BPM | BODY MASS INDEX: 22.79 KG/M2 | RESPIRATION RATE: 18 BRPM | HEIGHT: 66 IN | DIASTOLIC BLOOD PRESSURE: 70 MMHG | OXYGEN SATURATION: 100 % | TEMPERATURE: 97.1 F | SYSTOLIC BLOOD PRESSURE: 102 MMHG

## 2023-02-10 DIAGNOSIS — Z12.4 CERVICAL CANCER SCREENING: ICD-10-CM

## 2023-02-10 DIAGNOSIS — Z00.00 ROUTINE GENERAL MEDICAL EXAMINATION AT A HEALTH CARE FACILITY: ICD-10-CM

## 2023-02-10 DIAGNOSIS — Z13.220 SCREENING CHOLESTEROL LEVEL: ICD-10-CM

## 2023-02-10 DIAGNOSIS — Z11.59 NEED FOR HEPATITIS C SCREENING TEST: ICD-10-CM

## 2023-02-10 DIAGNOSIS — F41.9 ANXIETY: ICD-10-CM

## 2023-02-10 DIAGNOSIS — Z13.1 SCREENING FOR DIABETES MELLITUS: ICD-10-CM

## 2023-02-10 DIAGNOSIS — Z72.0 TOBACCO ABUSE: ICD-10-CM

## 2023-02-10 DIAGNOSIS — R31.29 MICROSCOPIC HEMATURIA: ICD-10-CM

## 2023-02-10 DIAGNOSIS — Z11.4 SCREENING FOR HIV (HUMAN IMMUNODEFICIENCY VIRUS): ICD-10-CM

## 2023-02-10 DIAGNOSIS — Z12.31 ENCOUNTER FOR SCREENING MAMMOGRAM FOR MALIGNANT NEOPLASM OF BREAST: Primary | ICD-10-CM

## 2023-02-10 DIAGNOSIS — Z11.59 ENCOUNTER FOR HEPATITIS C SCREENING TEST FOR LOW RISK PATIENT: ICD-10-CM

## 2023-02-10 DIAGNOSIS — Z79.899 CONTROLLED SUBSTANCE AGREEMENT SIGNED: ICD-10-CM

## 2023-02-10 DIAGNOSIS — Z01.419 PAP TEST, AS PART OF ROUTINE GYNECOLOGICAL EXAMINATION: ICD-10-CM

## 2023-02-10 LAB
ALBUMIN UR-MCNC: NEGATIVE MG/DL
ANION GAP SERPL CALCULATED.3IONS-SCNC: 7 MMOL/L (ref 7–15)
APPEARANCE UR: CLEAR
BILIRUB UR QL STRIP: NEGATIVE
BUN SERPL-MCNC: 12.4 MG/DL (ref 6–20)
CALCIUM SERPL-MCNC: 9 MG/DL (ref 8.6–10)
CHLORIDE SERPL-SCNC: 104 MMOL/L (ref 98–107)
CHOLEST SERPL-MCNC: 220 MG/DL
COLOR UR AUTO: NORMAL
CREAT SERPL-MCNC: 0.87 MG/DL (ref 0.51–0.95)
DEPRECATED HCO3 PLAS-SCNC: 29 MMOL/L (ref 22–29)
GFR SERPL CREATININE-BSD FRML MDRD: 85 ML/MIN/1.73M2
GLUCOSE SERPL-MCNC: 93 MG/DL (ref 70–99)
GLUCOSE UR STRIP-MCNC: NEGATIVE MG/DL
HDLC SERPL-MCNC: 58 MG/DL
HGB UR QL STRIP: NEGATIVE
KETONES UR STRIP-MCNC: NEGATIVE MG/DL
LDLC SERPL CALC-MCNC: 141 MG/DL
LEUKOCYTE ESTERASE UR QL STRIP: NEGATIVE
NITRATE UR QL: NEGATIVE
NONHDLC SERPL-MCNC: 162 MG/DL
PH UR STRIP: 5.5 [PH] (ref 5–9)
POTASSIUM SERPL-SCNC: 4.2 MMOL/L (ref 3.4–5.3)
SODIUM SERPL-SCNC: 140 MMOL/L (ref 136–145)
SP GR UR STRIP: 1.02 (ref 1–1.03)
TRIGL SERPL-MCNC: 103 MG/DL
UROBILINOGEN UR STRIP-MCNC: NORMAL MG/DL

## 2023-02-10 PROCEDURE — 80061 LIPID PANEL: CPT | Mod: ZL | Performed by: PHYSICIAN ASSISTANT

## 2023-02-10 PROCEDURE — 86803 HEPATITIS C AB TEST: CPT | Mod: ZL | Performed by: PHYSICIAN ASSISTANT

## 2023-02-10 PROCEDURE — 36415 COLL VENOUS BLD VENIPUNCTURE: CPT | Mod: ZL | Performed by: PHYSICIAN ASSISTANT

## 2023-02-10 PROCEDURE — 81003 URINALYSIS AUTO W/O SCOPE: CPT | Mod: ZL | Performed by: PHYSICIAN ASSISTANT

## 2023-02-10 PROCEDURE — 87624 HPV HI-RISK TYP POOLED RSLT: CPT | Mod: ZL | Performed by: PHYSICIAN ASSISTANT

## 2023-02-10 PROCEDURE — G0123 SCREEN CERV/VAG THIN LAYER: HCPCS | Performed by: PHYSICIAN ASSISTANT

## 2023-02-10 PROCEDURE — 99396 PREV VISIT EST AGE 40-64: CPT | Performed by: PHYSICIAN ASSISTANT

## 2023-02-10 PROCEDURE — 87389 HIV-1 AG W/HIV-1&-2 AB AG IA: CPT | Mod: ZL | Performed by: PHYSICIAN ASSISTANT

## 2023-02-10 PROCEDURE — 80048 BASIC METABOLIC PNL TOTAL CA: CPT | Mod: ZL | Performed by: PHYSICIAN ASSISTANT

## 2023-02-10 RX ORDER — ALPRAZOLAM 0.5 MG
0.5 TABLET ORAL 2 TIMES DAILY PRN
Qty: 10 TABLET | Refills: 0 | Status: SHIPPED | OUTPATIENT
Start: 2023-02-10 | End: 2024-02-07

## 2023-02-10 RX ORDER — BUPROPION HYDROCHLORIDE 150 MG/1
150 TABLET ORAL EVERY MORNING
Qty: 90 TABLET | Refills: 1 | Status: SHIPPED | OUTPATIENT
Start: 2023-02-10 | End: 2023-08-10

## 2023-02-10 ASSESSMENT — ENCOUNTER SYMPTOMS
HEARTBURN: 0
CONSTIPATION: 0
BREAST MASS: 0
JOINT SWELLING: 0
HEMATURIA: 0
FREQUENCY: 0
SHORTNESS OF BREATH: 0
CHILLS: 0
PARESTHESIAS: 0
PALPITATIONS: 0
ARTHRALGIAS: 0
HEADACHES: 0
DIARRHEA: 0
DIZZINESS: 0
SORE THROAT: 0
ABDOMINAL PAIN: 0
EYE PAIN: 0
WEAKNESS: 0
COUGH: 0
NERVOUS/ANXIOUS: 0
NAUSEA: 0
DYSURIA: 0
MYALGIAS: 0
HEMATOCHEZIA: 0
FEVER: 0

## 2023-02-10 ASSESSMENT — PAIN SCALES - GENERAL: PAINLEVEL: NO PAIN (0)

## 2023-02-10 NOTE — PROGRESS NOTES
SUBJECTIVE:   CC: Abril is an 42 year old who presents for preventive health visit.   Patient has been advised of split billing requirements and indicates understanding: Yes  Healthy Habits:     Getting at least 3 servings of Calcium per day:  Yes    Bi-annual eye exam:  Yes    Dental care twice a year:  Yes    Sleep apnea or symptoms of sleep apnea:  None    Diet:  Regular (no restrictions)    Frequency of exercise:  6-7 days/week    Duration of exercise:  30-45 minutes    Taking medications regularly:  Yes    Medication side effects:  None    PHQ-2 Total Score: 0    Additional concerns today:  Yes    Patient's last menstrual period was 01/29/2023 (exact date).   Contraception: none  Risk for STI?: none  Last pap:   1/22/2015 - nl pap and HPV  Repeated today  Any hx of abnormal paps:  Many years ago  FH of early CA?: no  Cholesterol/DM concerns/screening: none, completed  Tobacco?: yes  Lung cancer screening: na  Calcium intake: yes  DEXA: na  Last mammo: none, ordered  Colonoscopy: na  Hepatitis C screen: none, ordered  HIV screen: none, ordered  Immunizations: declines vaccines    Patient is currently smoking.  Has tried Wellbutrin in the past to quit smoking.  Work for a little bit.  Would like to start again.  Currently smoking 1 pack/day.  Tolerated the medication well in the past.  Struggles with mild anxiety.  Wellbutrin also helped to calm down some of the anxiety.    Patient is history of anxiety with travel.  Has used alprazolam sparingly in the past for travel.  Going on 3 trips this year.  With like a refill of the alprazolam.  Tolerates the medication well.  No side effects noted.    Patient was recently seen for kidney stones.  Currently asymptomatic.  Doing well.  No blood in the stool or urine.    Today's PHQ-2 Score:   PHQ-2 ( 1999 Pfizer) 2/3/2023   Q1: Little interest or pleasure in doing things 0   Q2: Feeling down, depressed or hopeless 0   PHQ-2 Score 0   PHQ-2 Total Score (12-17 Years)-  Positive if 3 or more points; Administer PHQ-A if positive -   Q1: Little interest or pleasure in doing things Not at all   Q2: Feeling down, depressed or hopeless Not at all   PHQ-2 Score 0       Have you ever done Advance Care Planning? (For example, a Health Directive, POLST, or a discussion with a medical provider or your loved ones about your wishes): No, advance care planning information given to patient to review.  Patient declined advance care planning discussion at this time.    Social History     Tobacco Use     Smoking status: Every Day     Packs/day: 1.00     Years: 16.00     Pack years: 16.00     Types: Cigarettes     Smokeless tobacco: Never     Tobacco comments:     Quit smoking: has been trying   Substance Use Topics     Alcohol use: Yes     Alcohol/week: 0.0 standard drinks     Comment: Alcoholic Drinks/day: occasionally, maybe four per month         Alcohol Use 2/3/2023   Prescreen: >3 drinks/day or >7 drinks/week? No       Reviewed orders with patient.  Reviewed health maintenance and updated orders accordingly - Yes  Labs reviewed in EPIC  BP Readings from Last 3 Encounters:   02/10/23 102/70   11/29/22 97/65   11/21/22 132/80    Wt Readings from Last 3 Encounters:   02/10/23 64.3 kg (141 lb 12.8 oz)   12/05/22 62 kg (136 lb 9.6 oz)   11/29/22 68.5 kg (151 lb)                  Patient Active Problem List   Diagnosis     Depo-Provera contraceptive status     Generalized hyperhidrosis     Tobacco abuse     Left ureteral stone     Hydronephrosis     Urinary tract infection     Leukocytosis     Urinary tract infection without hematuria, site unspecified     Encounter for screening laboratory testing for COVID-19 virus     Past Surgical History:   Procedure Laterality Date     ARTHROSCOPY KNEE      2007,right     COMBINED CYSTOSCOPY, URETEROSCOPY, LASER HOLMIUM LITHOTRIPSY URETER(S) Left 11/29/2022    Procedure: Left ureteroscopy with laser lithotripsy and stent exchange;  Surgeon: Samir Phillips MD;   Location: [facility] OR     CONIZATION LEEP      2002     CYSTOSCOPY, RETROGRADES, INSERT STENT URETER(S), COMBINED Left 11/15/2022    Procedure: Left retrograde pyelogram and stent placement;  Surgeon: Samir Phillips MD;  Location: [facility] OR       Social History     Tobacco Use     Smoking status: Every Day     Packs/day: 1.00     Years: 16.00     Pack years: 16.00     Types: Cigarettes     Smokeless tobacco: Never     Tobacco comments:     Quit smoking: has been trying   Substance Use Topics     Alcohol use: Yes     Alcohol/week: 0.0 standard drinks     Comment: Alcoholic Drinks/day: occasionally, maybe four per month     Family History   Problem Relation Age of Onset     Other - See Comments Mother         MS in remission     Nephrolithiasis Mother      Nephrolithiasis Father      Other - See Comments Sister         Endometriosis     Nephrolithiasis Brother      Cancer Maternal Grandmother         Cancer     Cancer Maternal Grandfather         Cancer     Cancer Paternal Grandmother 95        Cancer     Cancer Paternal Grandfather 97        Cancer     Heart Disease Son         Heart Disease, Bicuspid aortic valve         Current Outpatient Medications   Medication Sig Dispense Refill     ALPRAZolam (XANAX) 0.5 MG tablet Take 1 tablet (0.5 mg) by mouth 2 times daily as needed for anxiety 10 tablet 0     buPROPion (WELLBUTRIN XL) 150 MG 24 hr tablet Take 1 tablet (150 mg) by mouth every morning 90 tablet 1     Multiple Vitamins-Minerals (WOMENS MULTI GUMMIES) CHEW Take 1 chew tab by mouth daily       Probiotic Product (PROBIOTIC GUMMIES PO) Take 1 chew tab by mouth daily       No Known Allergies  Recent Labs   Lab Test 11/17/22  0930 11/16/22  0655   CR 0.89 0.80   GFRESTIMATED 83 >90   POTASSIUM 3.8 4.1        Breast Cancer Screening:  Any new diagnosis of family breast, ovarian, or bowel cancer? No    FHS-7: No flowsheet data found.    Mammogram Screening - Offered annual screening and updated Health Maintenance for [place]  plan based on risk factor consideration    Pertinent mammograms are reviewed under the imaging tab.    History of abnormal Pap smear: Last 3 Pap and HPV Results:       Reviewed and updated as needed this visit by clinical staff   Tobacco  Allergies  Meds              Reviewed and updated as needed this visit by Provider                 Past Medical History:   Diagnosis Date     History of cervical dysplasia           Past Surgical History:   Procedure Laterality Date     ARTHROSCOPY KNEE      2007,right     COMBINED CYSTOSCOPY, URETEROSCOPY, LASER HOLMIUM LITHOTRIPSY URETER(S) Left 2022    Procedure: Left ureteroscopy with laser lithotripsy and stent exchange;  Surgeon: Samir Phillips MD;  Location: [facility]     CONIZATION LEEP      2002     CYSTOSCOPY, RETROGRADES, INSERT STENT URETER(S), COMBINED Left 11/15/2022    Procedure: Left retrograde pyelogram and stent placement;  Surgeon: Samir Phillips MD;  Location: [facility]     OB History    Para Term  AB Living   1 1 1 0 0 1   SAB IAB Ectopic Multiple Live Births   0 0 0 0 0       Review of Systems   Constitutional: Negative for chills and fever.   HENT: Negative for congestion, ear pain, hearing loss and sore throat.    Eyes: Negative for pain and visual disturbance.   Respiratory: Negative for cough and shortness of breath.    Cardiovascular: Negative for chest pain, palpitations and peripheral edema.   Gastrointestinal: Negative for abdominal pain, constipation, diarrhea, heartburn, hematochezia and nausea.   Breasts:  Negative for tenderness, breast mass and discharge.   Genitourinary: Negative for dysuria, frequency, genital sores, hematuria, pelvic pain, urgency, vaginal bleeding and vaginal discharge.   Musculoskeletal: Negative for arthralgias, joint swelling and myalgias.   Skin: Negative for rash.   Neurological: Negative for dizziness, weakness, headaches and paresthesias.   Psychiatric/Behavioral: Negative for mood changes. The  "patient is not nervous/anxious.           OBJECTIVE:   /70 (BP Location: Right arm, Patient Position: Sitting, Cuff Size: Adult Large)   Pulse 74   Temp 97.1  F (36.2  C) (Tympanic)   Resp 18   Ht 1.664 m (5' 5.5\")   Wt 64.3 kg (141 lb 12.8 oz)   LMP 01/29/2023 (Exact Date)   SpO2 100%   BMI 23.24 kg/m    Physical Exam  GENERAL: healthy, alert and no distress  EYES: Eyes grossly normal to inspection, PERRL and conjunctivae and sclerae normal  HENT: ear canals and TM's normal, nose and mouth without ulcers or lesions  NECK: no adenopathy, no asymmetry, masses, or scars and thyroid normal to palpation  RESP: lungs clear to auscultation - no rales, rhonchi or wheezes  BREAST: normal without masses, tenderness or nipple discharge and no palpable axillary masses or adenopathy  CV: regular rate and rhythm, normal S1 S2, no S3 or S4, no murmur, click or rub, no peripheral edema and peripheral pulses strong  ABDOMEN: soft, nontender, no hepatosplenomegaly, no masses and bowel sounds normal   (female): normal female external genitalia, normal urethral meatus, vaginal mucosa pink, moist, well rugated, and normal cervix/adnexa/uterus without masses or discharge  MS: no gross musculoskeletal defects noted, no edema  SKIN: no suspicious lesions or rashes  NEURO: Normal strength and tone, mentation intact and speech normal  PSYCH: mentation appears normal, affect normal/bright    Diagnostic Test Results:  Labs reviewed in Epic    ASSESSMENT/PLAN:       ICD-10-CM    1. Encounter for screening mammogram for malignant neoplasm of breast  Z12.31 MA Screen Bilateral w/Chivo      2. Need for hepatitis C screening test  Z11.59 Hepatitis C Screen Reflex to HCV RNA Quant and Genotype     Hepatitis C Screen Reflex to HCV RNA Quant and Genotype      3. Cervical cancer screening  Z12.4 Pap Screen with HPV - recommended age 30 - 65 years      4. Anxiety  F41.9 buPROPion (WELLBUTRIN XL) 150 MG 24 hr tablet     ALPRAZolam (XANAX) " 0.5 MG tablet     Drug Confirmation Panel Urine with Creat      5. Encounter for hepatitis C screening test for low risk patient  Z11.59 Hepatitis C Screen Reflex to HCV RNA Quant and Genotype     Hepatitis C Screen Reflex to HCV RNA Quant and Genotype      6. Screening for HIV (human immunodeficiency virus)  Z11.4 HIV Antigen Antibody Combo     HIV Antigen Antibody Combo      7. Pap test, as part of routine gynecological examination  Z01.419 Pap Screen with HPV - recommended age 30 - 65 years      8. Screening cholesterol level  Z13.220 Lipid Panel     Lipid Panel      9. Screening for diabetes mellitus  Z13.1 Basic Metabolic Panel     Basic Metabolic Panel      10. Tobacco abuse  Z72.0 buPROPion (WELLBUTRIN XL) 150 MG 24 hr tablet      11. Microscopic hematuria  R31.29 UA reflex to Microscopic      12. Routine general medical examination at a health care facility  Z00.00         Microscopic hematuria: Completed repeat urinalysis to ensure that the blood in her urine has resolved.  Currently asymptomatic.    Tobacco abuse and anxiety: Started on Wellbutrin XL.  Can see a counselor if needed.  Cannot smoke for the first week while on the medication.  Then needs to quit smoking.    Completed cholesterol and diabetes mellitus screening.  Completed low risk HIV and hepatitis C screen.    Anxiety: Patient was given alprazolam 0.5 mg tablets quantity 10 with 0 refills to use sparingly as needed for severe anxiety and travel anxiety.    Patient was given the side effect profile and the safety concerns with using the controlled medication prescription.   Patient should not share the controlled medication with other people.   Patient was given a random urine drug test to ensure that they are appropriately using their medication.   UDS: 2/10/2023  CSA signed: 2/10/2023   website was reviewed.     PDMP Review       Value Time User    State PDMP site checked  Yes 2/10/2023 11:38 AM Gloria Laura PA-C        Repeat  physical in 1 year.    Patient has been advised of split billing requirements and indicates understanding: Yes      COUNSELING:  Reviewed preventive health counseling, as reflected in patient instructions       Regular exercise       Healthy diet/nutrition       Vision screening       Hearing screening       Osteoporosis prevention/bone health       Safe sex practices/STD prevention       Consider Hep C screening for all patients one time for ages 18-79 years       HIV screeninx in teen years, 1x in adult years, and at intervals if high risk       One time pneumovax for smokers        She reports that she has been smoking cigarettes. She has a 16.00 pack-year smoking history. She has never used smokeless tobacco.  Nicotine/Tobacco Cessation Plan:   Pharmacotherapies : other wellbutrin      NADIA LambertC  [facility]

## 2023-02-10 NOTE — LETTER
[facility]  02/10/23  Patient: Abril Schulz  YOB: 1980  Medical Record Number: [id number]                                                                                  Non-Opioid Controlled Substance Agreement    This is an agreement between you and your provider regarding safe and appropriate use of controlled substances prescribed by your care team. Controlled substances are?medicines that can cause physical and mental dependence (abuse).     There are strict laws about having and using these medicines. We here at [facility] are  committed to working with you in your efforts to get better. To support you in this work, we'll help you schedule regular office appointments for medicine refills. If we must cancel or change your appointment for any reason, we'll make sure you have enough medicine to last until your next appointment.     As a Provider, I will:     Listen carefully to your concerns while treating you with respect.     Recommend a treatment plan that I believe is in your best interest and may involve therapies other than medicine.      Talk with you often about the possible benefits and the risk of harm of any medicine that we prescribe for you.    Assess the safety of this medicine and check how well it works.      Provide a plan on how to taper (discontinue or go off) using this medicine if the decision is made to stop its use.      ::  As a Patient, I understand controlled substances:       Are prescribed by my care provider to help me function or work and manage my condition(s).?    Are strong medicines and can cause serious side effects.       Need to be taken exactly as prescribed.?Combining controlled substances with certain medicines or chemicals (such as illegal drugs, alcohol, sedatives, sleeping pills, and benzodiazepines) can be dangerous or even fatal.? If I stop taking my medicines suddenly, I may have severe withdrawal symptoms.     The  risks, benefits, and side effects of these medicine(s) were explained to me. I agree that:    1. I will take part in other treatments as advised by my care team. This may be psychiatry or counseling, physical therapy, behavioral therapy, group treatment or a referral to specialist.    2. I will keep all my appointments and understand this is part of the monitoring of controlled substances.?My care team may require an office visit for EVERY controlled substance refill. If I miss appointments or don t follow instructions, my care team may stop my medicine    3. I will take my medicines as prescribed. I will not change the dose or schedule unless my care team tells me to. There will be no refills if I run out early.      4. I may be asked to come to the clinic and complete a urine drug test or complete a pill count. If I don t give a urine sample or participate in a pill count, the care team may stop my medicine.    5. I will only receive controlled substance prescriptions from this clinic. If I am treated by another provider, I will tell them that I am taking controlled substances and that I have a treatment agreement with this provider. I will inform my [facility] care team within one business day if I am given a prescription for any controlled substance by another healthcare provider. My [facility] care team can contact other providers and pharmacists about my use of any medicines.    6. It is up to me to make sure that I don't run out of my medicines on weekends or holidays.?If my care team is willing to refill my prescription without a visit, I must request refills only during office hours. Refills may take up to 3 business days to process. I will use one pharmacy to fill all my controlled substance prescriptions. I will notify the clinic about any changes to my insurance or medicine availability.    7. I am responsible for my prescriptions. If the medicine/prescription is lost, stolen or destroyed,  it will not be replaced.?I also agree not to share controlled substance medicines with anyone.     8. I am aware I should not use any illegal or recreational drugs. I agree not to drink alcohol unless my care team says I can.     9. If I enroll in the [place] Medical Cannabis program, I will tell my care team before my next refill.    10. I will tell my care team right away if I become pregnant, have a new medical problem treated outside of my regular clinic, or have a change in my medicines.     11. I understand that this medicine can affect my thinking, judgment and reaction time.? Alcohol and drugs affect the brain and body, which can affect the safety of my driving. Being under the influence of alcohol or drugs can affect my decision-making, behaviors, personal safety and the safety of others. Driving while impaired (DWI) can occur if a person is driving, operating or in physical control of a car, motorcycle, boat, snowmobile, ATV, motorbike, off-road vehicle or any other motor vehicle ([place] Statute 169A.20). I understand the risk if I choose to drive or operate any vehicle or machinery.    I understand that if I do not follow any of the conditions above, my prescriptions or treatment may be stopped or changed.   I agree that my provider, clinic care team and pharmacy may work with any city, state or federal law enforcement agency that investigates the misuse, sale or other diversion of my controlled medicine. I will allow my provider to discuss my care with, or share a copy of, this agreement with any other treating provider, pharmacy or emergency room where I receive care.     I have read this agreement and have asked questions about anything I did not understand.    ________________________________________________________  Patient Signature - Abril Schulz     ___________________                   Date     ________________________________________________________  Provider Signature - Gloria Laura PA-C        ___________________                   Date     ________________________________________________________  Witness Signature (required if provider not present while patient signing)          ___________________                   Date

## 2023-02-10 NOTE — NURSING NOTE
Pt presents to clinic today for a physical.  Would like to discuss quitting smoking.       FOOD SECURITY SCREENING QUESTIONS:    The next two questions are to help us understand your food security.  If you are feeling you need any assistance in this area, we have resources available to support you today.    Hunger Vital Signs:  Within the past 12 months we worried whether our food would run out before we got money to buy more. Never  Within the past 12 months the food we bought just didn't last and we didn't have money to get more. Never          Medication Reconciliation: complete  Jak Martinez LPN,LPN on 2/10/2023 at 11:23 AM

## 2023-02-10 NOTE — PATIENT INSTRUCTIONS
"Healthy Strategies  Eat at least 3 meals a day and never skip breakfast.  Eat more slowly.  Decrease portion size.  Provide structure by using meal replacement bars or shakes, and/or low calorie frozen meals.  For good nutrition incorporate fruit, vegetables, whole grains, lean protein, and low-fat dairy.  Remove trigger foods from yourenvironment to avoid impulse eating.  Increase physical activity: get a pedometer and aim for 10,000 steps a day or 30-35 minutes of activity 5 days per week.  Weigh yourself daily or at least weekly.  Keep a record of what you eat and your activity.  Establish a support system such as afriend, group or program.    11. Read [place]'s \"Eat to Live\". Remember it is important to have a minimum of 1200 calories a day, okay to use olive oil, 40 grams of fiber daily. No more than two servings (the size of your palm) of red meat a week.     Please consider the following general health recommendations:    Eat a quality diet (generally, low in simple sugars, starches, cholesterol and saturated fat.)    Please get 1200 mg of calcium in divided doses with 800 units vitamin D in your diet daily. Take supplements as needed to obtain full recommended amounts.     Stay physically active. Regular walking or other exercise is one of the best ways to minimize pain of arthritis; maintain independence and mobility; maintain bone strength; maintain conditioning of your heart. Find something you enjoy and a friend to do it with you.    Maintain ideal weight. Your Body mass index is Body mass index is 23.24 kg/m .. Generally a BMI of 20-25 is considered ideal. Overweight is defined as 25-30, obese is 30-35 and markedly obese is greater than 35.    Apply sun block (SPF 25 or greater) on exposed skin anytime you are out in the sun to prevent skin cancer.     Wear a seatbelt whenever you are in a car.    Obtain a flu shot every fall.    You should have a tetanus booster at least once every 10 " years.    Schedule a mammogram annually starting at the age of 40 years old unless recommended earlier by your primary care provider. Come for a general exam once yearly.    Receive a pneumonia shot series after the age of 65 (repeat in 5 years if you have other risk factors) or before 65 if you have any high risk conditions (smoking history). This does not prevent all types of pneumonia, but reduces the risk of the worst bacterial causes of pneumonia.    Check blood sugar annually. Cholesterol annually unless you have had a normal level when last checked within 5 years.     I recommend that you have a general physical exam every year. You should have a pap test every 3 years between the ages of 21 and 30 and every 3-5 years between the ages of 30 and 65 depending on your test unless you have had previous abnormal pap smears, (in these cases the exams and PAP's should be done on a schedule as recommended by your primary care provider). If you have had hysterectomy in the past, your future Pap plan may be different.        Preventive Health Recommendations  Female Ages 40 to 49    Yearly exam:   See your health care provider every year in order to  Review health changes.   Discuss preventive care.    Review your medicines if your doctor prescribed any.    Get a Pap test every three years (unless you have an abnormal result and your provider advises testing more often).    If you get Pap tests with HPV test, you only need to test every 5 years, unless you have an abnormal result. You do not need a Pap test if your uterus was removed (hysterectomy) and you have not had cancer.    You should be tested each year for STDs (sexually transmitted diseases), if you're at risk.   Ask your doctor if you should have a mammogram.    Have a colonoscopy (test for colon cancer) if someone in your family has had colon cancer or polyps before age 50.     Have a cholesterol test every 5 years.     Have a diabetes test (fasting glucose)  after age 45. If you are at risk for diabetes, you should have this test every 3 years.    Shots: Get a flu shot each year. Get a tetanus shot every 10 years.     Nutrition:   Eat at least 5 servings of fruits and vegetables each day.  Eat whole-grain bread, whole-wheat pasta and brown rice instead of white grains and rice.  Get adequate Calcium and Vitamin D.      Lifestyle  Exercise at least 150 minutes a week (an average of 30 minutes a day, 5 days a week). This will help you control your weight and prevent disease.  Limit alcohol to one drink per day.  No smoking.   Wear sunscreen to prevent skin cancer.  See your dentist every six months for an exam and cleaning.

## 2023-02-11 LAB
HCV AB SERPL QL IA: NONREACTIVE
HIV 1+2 AB+HIV1 P24 AG SERPL QL IA: NONREACTIVE

## 2023-02-16 LAB
BKR LAB AP GYN ADEQUACY: NORMAL
BKR LAB AP GYN INTERPRETATION: NORMAL
BKR LAB AP HPV REFLEX: NORMAL
BKR LAB AP LMP: NORMAL
BKR LAB AP PREVIOUS ABNORMAL: NORMAL
PATH REPORT.COMMENTS IMP SPEC: NORMAL
PATH REPORT.COMMENTS IMP SPEC: NORMAL
PATH REPORT.RELEVANT HX SPEC: NORMAL

## 2023-02-19 LAB
HUMAN PAPILLOMA VIRUS 16 DNA: NEGATIVE
HUMAN PAPILLOMA VIRUS 18 DNA: NEGATIVE
HUMAN PAPILLOMA VIRUS FINAL DIAGNOSIS: NORMAL
HUMAN PAPILLOMA VIRUS OTHER HR: NEGATIVE

## 2023-03-20 ENCOUNTER — HOSPITAL ENCOUNTER (OUTPATIENT)
Dept: MAMMOGRAPHY | Facility: OTHER | Age: 43
Discharge: HOME | End: 2023-03-20
Attending: PHYSICIAN ASSISTANT | Admitting: PHYSICIAN ASSISTANT
Payer: COMMERCIAL

## 2023-03-20 DIAGNOSIS — Z12.31 ENCOUNTER FOR SCREENING MAMMOGRAM FOR MALIGNANT NEOPLASM OF BREAST: ICD-10-CM

## 2023-03-20 PROCEDURE — 77067 SCR MAMMO BI INCL CAD: CPT

## 2023-08-04 DIAGNOSIS — Z72.0 TOBACCO ABUSE: ICD-10-CM

## 2023-08-04 DIAGNOSIS — F41.9 ANXIETY: ICD-10-CM

## 2023-08-08 NOTE — TELEPHONE ENCOUNTER
[facility] sent Rx request for the following:   BUPROPION HCL  MG TABLET   Last Prescription Date:   2/10/23  Last Fill Qty/Refills:         90, R-1    Last Office Visit:              2/10/23   Future Office visit:           none     Gisela Rey RN on 8/8/2023 at 9:27 AM

## 2023-08-10 RX ORDER — BUPROPION HYDROCHLORIDE 150 MG/1
150 TABLET ORAL EVERY MORNING
Qty: 90 TABLET | Refills: 1 | Status: SHIPPED | OUTPATIENT
Start: 2023-08-10 | End: 2024-08-28

## 2023-08-25 ENCOUNTER — OFFICE VISIT (OUTPATIENT)
Dept: FAMILY MEDICINE | Facility: OTHER | Age: 43
End: 2023-08-25
Payer: COMMERCIAL

## 2023-08-25 ENCOUNTER — HOSPITAL ENCOUNTER (OUTPATIENT)
Dept: GENERAL RADIOLOGY | Facility: OTHER | Age: 43
Discharge: HOME | End: 2023-08-25
Attending: NURSE PRACTITIONER
Payer: COMMERCIAL

## 2023-08-25 VITALS
BODY MASS INDEX: 24.01 KG/M2 | TEMPERATURE: 97.3 F | WEIGHT: 146.5 LBS | RESPIRATION RATE: 16 BRPM | DIASTOLIC BLOOD PRESSURE: 76 MMHG | SYSTOLIC BLOOD PRESSURE: 104 MMHG | HEART RATE: 85 BPM

## 2023-08-25 DIAGNOSIS — R19.8 SUPRAPUBIC FULLNESS: ICD-10-CM

## 2023-08-25 DIAGNOSIS — K59.00 CONSTIPATION, UNSPECIFIED CONSTIPATION TYPE: Primary | ICD-10-CM

## 2023-08-25 DIAGNOSIS — K59.00 CONSTIPATION, UNSPECIFIED CONSTIPATION TYPE: ICD-10-CM

## 2023-08-25 DIAGNOSIS — K64.9 ACUTE HEMORRHOID: ICD-10-CM

## 2023-08-25 LAB
ALBUMIN UR-MCNC: NEGATIVE MG/DL
APPEARANCE UR: CLEAR
BACTERIA #/AREA URNS HPF: ABNORMAL /HPF
BILIRUB UR QL STRIP: NEGATIVE
COLOR UR AUTO: ABNORMAL
GLUCOSE UR STRIP-MCNC: NEGATIVE MG/DL
HGB UR QL STRIP: NEGATIVE
KETONES UR STRIP-MCNC: 20 MG/DL
LEUKOCYTE ESTERASE UR QL STRIP: NEGATIVE
MUCOUS THREADS #/AREA URNS LPF: PRESENT /LPF
NITRATE UR QL: NEGATIVE
PH UR STRIP: 6 [PH] (ref 5–9)
RBC URINE: 1 /HPF
SP GR UR STRIP: 1.01 (ref 1–1.03)
SQUAMOUS EPITHELIAL: 3 /HPF
UROBILINOGEN UR STRIP-MCNC: NORMAL MG/DL
WBC URINE: 1 /HPF

## 2023-08-25 PROCEDURE — 99214 OFFICE O/P EST MOD 30 MIN: CPT | Performed by: NURSE PRACTITIONER

## 2023-08-25 PROCEDURE — 81001 URINALYSIS AUTO W/SCOPE: CPT | Mod: ZL | Performed by: NURSE PRACTITIONER

## 2023-08-25 PROCEDURE — 87086 URINE CULTURE/COLONY COUNT: CPT | Mod: ZL | Performed by: NURSE PRACTITIONER

## 2023-08-25 PROCEDURE — 74018 RADEX ABDOMEN 1 VIEW: CPT

## 2023-08-25 RX ORDER — SENNA AND DOCUSATE SODIUM 50; 8.6 MG/1; MG/1
1 TABLET, FILM COATED ORAL AT BEDTIME
Qty: 30 TABLET | Refills: 0 | Status: SHIPPED | OUTPATIENT
Start: 2023-08-25

## 2023-08-25 RX ORDER — POLYETHYLENE GLYCOL 3350 17 G/17G
1 POWDER, FOR SOLUTION ORAL 2 TIMES DAILY
Qty: 510 G | Refills: 0 | Status: SHIPPED | OUTPATIENT
Start: 2023-08-25

## 2023-08-25 RX ORDER — DOCUSATE SODIUM 100 MG/1
100 CAPSULE, LIQUID FILLED ORAL 2 TIMES DAILY
Qty: 30 CAPSULE | Refills: 0 | Status: SHIPPED | OUTPATIENT
Start: 2023-08-25

## 2023-08-25 ASSESSMENT — PAIN SCALES - GENERAL: PAINLEVEL: MILD PAIN (3)

## 2023-08-25 ASSESSMENT — ENCOUNTER SYMPTOMS
VOMITING: 0
CONSTIPATION: 1
ABDOMINAL DISTENTION: 1
MUSCULOSKELETAL NEGATIVE: 1
FEVER: 0
CONSTITUTIONAL NEGATIVE: 1
SHORTNESS OF BREATH: 0
RESPIRATORY NEGATIVE: 1
FATIGUE: 0
RECTAL PAIN: 0
NAUSEA: 1
COUGH: 0
CARDIOVASCULAR NEGATIVE: 1

## 2023-08-25 NOTE — PROGRESS NOTES
Abril Schulz  1980    ASSESSMENT/PLAN:   1. Constipation, unspecified constipation type  - XR Abdomen 1 View; Future  - docusate sodium (COLACE) 100 MG capsule; Take 1 capsule (100 mg) by mouth 2 times daily  Dispense: 30 capsule; Refill: 0  - SENNA-docusate sodium (SENNA S) 8.6-50 MG tablet; Take 1 tablet by mouth At Bedtime  Dispense: 30 tablet; Refill: 0  - polyethylene glycol (MIRALAX) 17 GM/Dose powder; Take 17 g (1 Capful) by mouth 2 times daily  Dispense: 510 g; Refill: 0    Patient has been struggling with constipation for 3 weeks despite adding in over-the-counter remedies, Benefiber and probiotics.  No prior history of inflammatory bowel disease or chronic constipation.  She did briefly use MiraLAX, Dulcolax and enema a couple of weeks ago.  Recommend obtaining abdominal x-ray today.  X-ray did reveal moderate amount of stool throughout right-sided and traverse colon.  Discussed plan with patient including twice daily MiraLAX, morning and night until she is having soft regular daily bowel movements, then she can decrease frequency and slowly titrate back on MiraLAX to once a day every other day.  Recommend using over-the-counter senna docusate sodium at bedtime for the next several nights.  When she is having regular soft bowel movements I do think she benefit from taking MiraLAX a couple times a week over the next month to continue having regular bowel movements.  Should symptoms not improve with this plan of care, should she develop severe abdominal pain, nausea, vomiting or develop a fever I recommend she return for further evaluation.    2. Suprapubic fullness  Last week was experiencing urinary frequency, urgency and some suprapubic pressure associated with constipation.  Urinary symptoms have since resolved, no CVA tenderness or fever.  She did have some slight suprapubic pressure on exam.  Analysis obtained today returned showing small amount of mucus and bacteria however not consistent  with urinary tract infection.  No leukocyte esterase or WBCs.  Will add on urine culture to further screen for infection, however low suspicion based on resolving symptoms.  - UA with Microscopic reflex to Culture  - Urine Culture Aerobic Bacterial; Future    3. Acute hemorrhoid  Patient is aware to avoid straining.  Is using over-the-counter Preparation H cream.  We will focus on resolving constipation to allow hemorrhoids to heal.  Hemorrhoids are nonbothersome, nonpruritic, not an active bleeding other than with wiping, no pain or drainage.  Rectal exam was deferred today.  If she continues to have ongoing concerns, I would recommend rectal exam and further evaluation of hemorrhoids.    Patient agrees with plan of care and verbalizes understating. AVS printed. Patient education provided verbally and written instructions provided as requested. Patient made aware of emergent signs and symptoms to monitor for and when to seek additional care/follow up.     SUBJECTIVE:   CHIEF COMPLAINT/ REASON FOR VISIT  Patient presents with:  Constipation     HISTORY OF PRESENT ILLNESS  Abril Schulz is a pleasant 42 year old female [facility] with concerns for constipation.    She states has been constipated for the past 22 days.  This started with a solid abdominal distention and constipation for 6 days.  She was able to have a bowel movement however then constipation quickly returned for 2 more days.  She took over-the-counter Dulcolax MiraLAX and an enema.  Over the past 2 weeks she has been having 1 small bowel movement a day however does not feel like she is fully emptying.  She still feels the urge to have a bowel movement.  He has had some blood with bowel movements, states she does have a hemorrhoid which she is using Preparation H cream for.    She is having some suprapubic pressure and had increased urinary frequency and urgency last week however this has mostly resolved.  Denies any vaginal  itching, change in discharge or dysuria.  No hematuria.  She denies any significant abdominal pain or current fever.  She has felt intermittently nauseated, no vomiting.    I have reviewed the nursing notes.  I have reviewed allergies, medication list, problem list, and past medical history.    REVIEW OF SYSTEMS  Review of Systems   Constitutional: Negative.  Negative for fatigue and fever.   HENT: Negative.     Respiratory: Negative.  Negative for cough and shortness of breath.    Cardiovascular: Negative.  Negative for chest pain.   Gastrointestinal:  Positive for abdominal distention, constipation and nausea. Negative for rectal pain and vomiting.   Genitourinary: Negative.    Musculoskeletal: Negative.    Skin: Negative.    All other systems reviewed and are negative.       VITAL SIGNS  Vitals:    08/25/23 1141   BP: 104/76   Pulse: 85   Resp: 16   Temp: 97.3  F (36.3  C)   TempSrc: Tympanic   Weight: 66.5 kg (146 lb 8 oz)      Body mass index is 24.01 kg/m .    OBJECTIVE:   PHYSICAL EXAM  Physical Exam  Vitals reviewed.   Constitutional:       Appearance: Normal appearance. She is not ill-appearing or toxic-appearing.   HENT:      Head: Normocephalic and atraumatic.      Nose: Nose normal.   Eyes:      Conjunctiva/sclera: Conjunctivae normal.   Cardiovascular:      Rate and Rhythm: Normal rate and regular rhythm.      Pulses: Normal pulses.      Heart sounds: Normal heart sounds. No murmur heard.  Pulmonary:      Effort: Pulmonary effort is normal.      Breath sounds: Normal breath sounds. No wheezing.   Abdominal:      General: Bowel sounds are normal. There is distension.      Palpations: Abdomen is soft. There is no hepatomegaly or splenomegaly.      Tenderness: There is abdominal tenderness in the suprapubic area. There is no right CVA tenderness or left CVA tenderness. Negative signs include Fernandez's sign, Rovsing's sign, McBurney's sign and psoas sign.   Skin:     Capillary Refill: Capillary refill takes  less than 2 seconds.      Findings: No rash.   Neurological:      General: No focal deficit present.      Mental Status: She is alert and oriented to person, place, and time.   Psychiatric:         Mood and Affect: Mood normal.         Behavior: Behavior normal.         Thought Content: Thought content normal.         Judgment: Judgment normal.        DIAGNOSTICS  Results for orders placed or performed during the hospital encounter of 08/25/23   XR Abdomen 1 View     Status: None    Narrative    PROCEDURE:  XR ABDOMEN 1 VIEW    HISTORY:  Constipation, unspecified constipation type    TECHNIQUE:  Two radiographs of the abdomen.    COMPARISON:  11/15/2022.    FINDINGS:     The lung bases are clear. No subdiaphragmatic air is seen.    No dilated loops of small bowel or air-fluid levels are seen. Slightly  prominent right and transverse colon stool can be seen with  constipation.      Impression    IMPRESSION:    No obstruction or free air.    JACINDA DOMINGO MD         SYSTEM ID:  [id number]   Results for orders placed or performed in visit on 08/25/23   UA with Microscopic reflex to Culture     Status: Abnormal    Specimen: Urine, Midstream   Result Value Ref Range    Color Urine Light Yellow Colorless, Straw, Light Yellow, Yellow    Appearance Urine Clear Clear    Glucose Urine Negative Negative mg/dL    Bilirubin Urine Negative Negative    Ketones Urine 20 (A) Negative mg/dL    Specific Gravity Urine 1.010 1.000 - 1.030    Blood Urine Negative Negative    pH Urine 6.0 5.0 - 9.0    Protein Albumin Urine Negative Negative mg/dL    Urobilinogen Urine Normal Normal, 2.0 mg/dL    Nitrite Urine Negative Negative    Leukocyte Esterase Urine Negative Negative    Bacteria Urine Few (A) None Seen /HPF    Mucus Urine Present (A) None Seen /LPF    RBC Urine 1 <=2 /HPF    WBC Urine 1 <=5 /HPF    Squamous Epithelials Urine 3 (H) <=1 /HPF    Narrative    Urine Culture not indicated        Mariya Ding NP  [facility] &  [facility]

## 2023-08-25 NOTE — PATIENT INSTRUCTIONS
MiraLAX as directed, once in the morning and once in the evening until having soft regular bowel movements.  Then transition to MiraLAX once daily every other day until back to regular bowel movement pattern.  May resume using MiraLAX as needed for constipation.  Should be to have a soft bowel movement daily.    Senna at bedtime for the next several nights, until having soft regular bowel movements.    May use docusate sodium 100 mg once daily for constipation or morning and night for severe constipation.    Urinalysis returned normal, no signs of acute cystitis.

## 2023-08-25 NOTE — NURSING NOTE
Patient presents to clinic for concerns of constipation, hemorroids, and swollen lymph  /76   Pulse 85   Temp 97.3  F (36.3  C) (Tympanic)   Resp 16   Wt 66.5 kg (146 lb 8 oz)   LMP 08/14/2023 (Approximate)   BMI 24.01 kg/m    Munira Manjarrez LPN on 8/25/2023 at 11:43 AM

## 2023-08-27 LAB — BACTERIA UR CULT: NORMAL

## 2023-09-02 ENCOUNTER — OFFICE VISIT (OUTPATIENT)
Dept: FAMILY MEDICINE | Facility: OTHER | Age: 43
End: 2023-09-02
Payer: COMMERCIAL

## 2023-09-02 VITALS
WEIGHT: 142 LBS | BODY MASS INDEX: 23.66 KG/M2 | DIASTOLIC BLOOD PRESSURE: 60 MMHG | HEART RATE: 90 BPM | RESPIRATION RATE: 20 BRPM | OXYGEN SATURATION: 100 % | TEMPERATURE: 97.7 F | HEIGHT: 65 IN | SYSTOLIC BLOOD PRESSURE: 110 MMHG

## 2023-09-02 DIAGNOSIS — R10.12 ABDOMINAL PAIN, LEFT UPPER QUADRANT: Primary | ICD-10-CM

## 2023-09-02 LAB
ALBUMIN SERPL BCG-MCNC: 4.6 G/DL (ref 3.5–5.2)
ALBUMIN UR-MCNC: NEGATIVE MG/DL
ALP SERPL-CCNC: 70 U/L (ref 35–104)
ALT SERPL W P-5'-P-CCNC: 9 U/L (ref 0–50)
ANION GAP SERPL CALCULATED.3IONS-SCNC: 9 MMOL/L (ref 7–15)
APPEARANCE UR: CLEAR
AST SERPL W P-5'-P-CCNC: 15 U/L (ref 0–45)
BASOPHILS # BLD AUTO: 0 10E3/UL (ref 0–0.2)
BASOPHILS NFR BLD AUTO: 0 %
BILIRUB SERPL-MCNC: 0.6 MG/DL
BILIRUB UR QL STRIP: NEGATIVE
BUN SERPL-MCNC: 12.5 MG/DL (ref 6–20)
CALCIUM SERPL-MCNC: 9.6 MG/DL (ref 8.6–10)
CHLORIDE SERPL-SCNC: 105 MMOL/L (ref 98–107)
COLOR UR AUTO: YELLOW
CREAT SERPL-MCNC: 0.84 MG/DL (ref 0.51–0.95)
DEPRECATED HCO3 PLAS-SCNC: 26 MMOL/L (ref 22–29)
EOSINOPHIL # BLD AUTO: 0 10E3/UL (ref 0–0.7)
EOSINOPHIL NFR BLD AUTO: 0 %
ERYTHROCYTE [DISTWIDTH] IN BLOOD BY AUTOMATED COUNT: 12.3 % (ref 10–15)
GFR SERPL CREATININE-BSD FRML MDRD: 88 ML/MIN/1.73M2
GLUCOSE SERPL-MCNC: 106 MG/DL (ref 70–99)
GLUCOSE UR STRIP-MCNC: NEGATIVE MG/DL
HCG UR QL: NEGATIVE
HCT VFR BLD AUTO: 44.1 % (ref 35–47)
HGB BLD-MCNC: 14.9 G/DL (ref 11.7–15.7)
HGB UR QL STRIP: NEGATIVE
HOLD SPECIMEN: NORMAL
IMM GRANULOCYTES # BLD: 0.1 10E3/UL
IMM GRANULOCYTES NFR BLD: 0 %
KETONES UR STRIP-MCNC: NEGATIVE MG/DL
LEUKOCYTE ESTERASE UR QL STRIP: NEGATIVE
LIPASE SERPL-CCNC: 30 U/L (ref 13–60)
LYMPHOCYTES # BLD AUTO: 0.5 10E3/UL (ref 0.8–5.3)
LYMPHOCYTES NFR BLD AUTO: 3 %
MCH RBC QN AUTO: 30.7 PG (ref 26.5–33)
MCHC RBC AUTO-ENTMCNC: 33.8 G/DL (ref 31.5–36.5)
MCV RBC AUTO: 91 FL (ref 78–100)
MONOCYTES # BLD AUTO: 0.8 10E3/UL (ref 0–1.3)
MONOCYTES NFR BLD AUTO: 4 %
NEUTROPHILS # BLD AUTO: 17.6 10E3/UL (ref 1.6–8.3)
NEUTROPHILS NFR BLD AUTO: 93 %
NITRATE UR QL: NEGATIVE
NRBC # BLD AUTO: 0 10E3/UL
NRBC BLD AUTO-RTO: 0 /100
PH UR STRIP: 5.5 [PH] (ref 5–9)
PLATELET # BLD AUTO: 296 10E3/UL (ref 150–450)
POTASSIUM SERPL-SCNC: 4.4 MMOL/L (ref 3.4–5.3)
PROT SERPL-MCNC: 7.3 G/DL (ref 6.4–8.3)
RBC # BLD AUTO: 4.86 10E6/UL (ref 3.8–5.2)
SODIUM SERPL-SCNC: 140 MMOL/L (ref 136–145)
SP GR UR STRIP: 1.02 (ref 1–1.03)
UROBILINOGEN UR STRIP-MCNC: NORMAL MG/DL
WBC # BLD AUTO: 19.1 10E3/UL (ref 4–11)

## 2023-09-02 PROCEDURE — 83690 ASSAY OF LIPASE: CPT | Mod: ZL | Performed by: FAMILY MEDICINE

## 2023-09-02 PROCEDURE — 81003 URINALYSIS AUTO W/O SCOPE: CPT | Mod: ZL | Performed by: FAMILY MEDICINE

## 2023-09-02 PROCEDURE — 99214 OFFICE O/P EST MOD 30 MIN: CPT | Performed by: FAMILY MEDICINE

## 2023-09-02 PROCEDURE — 36415 COLL VENOUS BLD VENIPUNCTURE: CPT | Mod: ZL | Performed by: FAMILY MEDICINE

## 2023-09-02 PROCEDURE — 81025 URINE PREGNANCY TEST: CPT | Mod: ZL | Performed by: FAMILY MEDICINE

## 2023-09-02 PROCEDURE — 85025 COMPLETE CBC W/AUTO DIFF WBC: CPT | Mod: ZL | Performed by: FAMILY MEDICINE

## 2023-09-02 PROCEDURE — 80053 COMPREHEN METABOLIC PANEL: CPT | Mod: ZL | Performed by: FAMILY MEDICINE

## 2023-09-02 RX ORDER — METRONIDAZOLE 500 MG/1
500 TABLET ORAL 3 TIMES DAILY
Qty: 30 TABLET | Refills: 0 | Status: SHIPPED | OUTPATIENT
Start: 2023-09-02 | End: 2023-09-12

## 2023-09-02 RX ORDER — ONDANSETRON 4 MG/1
4 TABLET, FILM COATED ORAL EVERY 8 HOURS PRN
Qty: 25 TABLET | Refills: 0 | Status: SHIPPED | OUTPATIENT
Start: 2023-09-02 | End: 2024-08-28

## 2023-09-02 ASSESSMENT — PAIN SCALES - GENERAL: PAINLEVEL: MODERATE PAIN (4)

## 2023-09-02 NOTE — NURSING NOTE
"Chief Complaint   Patient presents with    Constipation     Continuing, vomiting this am     Patient reports has had constipation for a month Was started on stool softener. Has vomited today.   Initial /60   Pulse 90   Temp 97.7  F (36.5  C) (Tympanic)   Resp 20   Ht 1.651 m (5' 5\")   Wt 64.4 kg (142 lb)   LMP 08/14/2023 (Approximate)   SpO2 100%   BMI 23.63 kg/m   Estimated body mass index is 23.63 kg/m  as calculated from the following:    Height as of this encounter: 1.651 m (5' 5\").    Weight as of this encounter: 64.4 kg (142 lb).  Medication Reconciliation: complete    Bonnie Sam, MACIEJ    "

## 2023-09-02 NOTE — PROGRESS NOTES
Assessment & Plan     (R10.12) Abdominal pain, left upper quadrant  (primary encounter diagnosis)  Comment: has an elevated WBD as well as an elevated ANC.  BP and pulse are normal, so not septic. Exam is nearly normal, with mild abdomen pain on palpation. No clear source for symptoms however. Will have her push fluids and observe her symptoms closely over the next 1-2 days. If not improving, then CT to evaluate for diverticulitis.  Will start antibiotics to cover for this.  Plan: Comprehensive Metabolic Panel, CBC and         Differential, Lipase, UA reflex to Microscopic,        Pregnancy, Urine (HCG), ondansetron (ZOFRAN) 4         MG tablet                          No follow-ups on file.    Smooth Zaldivar MD  [facility] [place]    Gurmeet Samuels is a 42 year old, presenting for the following health issues:  Constipation (Continuing, vomiting this am)      HPI here with . Was seen 1 week ago for constipation. Symptoms for a month. Was given laxities and stool softeners. Has a follow up with her PCP next week. This morning woke up feeling ill. Emesis 2 times, once after water only. Fatigued. Tired. No fevers. Now has a bowel movement daily, but today had very liquid diarrhea, no blood in it. Last CT was 11/22, had kidney stones with stenting then. No blood in urine.  Some luq pain today. LMP was 8/14/23, lasted about 5 days. Has never really had this before now..     Past Surgical History:   Procedure Laterality Date    ARTHROSCOPY KNEE      2007,right    COMBINED CYSTOSCOPY, URETEROSCOPY, LASER HOLMIUM LITHOTRIPSY URETER(S) Left 11/29/2022    Procedure: Left ureteroscopy with laser lithotripsy and stent exchange;  Surgeon: Samir Phillips MD;  Location:  OR    [facility]      2002    CYSTOSCOPY, RETROGRADES, INSERT STENT URETER(S), COMBINED Left 11/15/2022    Procedure: Left retrograde pyelogram and stent placement;  Surgeon: Samir Phillips MD;  Location:  OR     Current  "Outpatient Medications   Medication    ALPRAZolam (XANAX) 0.5 MG tablet    buPROPion (WELLBUTRIN XL) 150 MG 24 hr tablet    docusate sodium (COLACE) 100 MG capsule    Multiple Vitamins-Minerals (WOMENS MULTI GUMMIES) CHEW    polyethylene glycol (MIRALAX) 17 GM/Dose powder    Probiotic Product (PROBIOTIC GUMMIES PO)    SENNA-docusate sodium (SENNA S) 8.6-50 MG tablet     No current facility-administered medications for this visit.                 Review of Systems         Objective    /60   Pulse 90   Temp 97.7  F (36.5  C) (Tympanic)   Resp 20   Ht 1.651 m (5' 5\")   Wt 64.4 kg (142 lb)   LMP 08/14/2023 (Approximate)   SpO2 100%   BMI 23.63 kg/m    Body mass index is 23.63 kg/m .  Physical Exam  Constitutional:       Appearance: Normal appearance.   Cardiovascular:      Rate and Rhythm: Normal rate and regular rhythm.   Pulmonary:      Effort: Pulmonary effort is normal. No respiratory distress.      Breath sounds: No stridor.   Abdominal:      General: Abdomen is flat. There is no distension.      Palpations: Abdomen is soft. There is no mass.      Tenderness: There is abdominal tenderness. There is no guarding.      Comments: Mild ruq pain on palpation without guarding.    Neurological:      General: No focal deficit present.      Mental Status: She is alert and oriented to person, place, and time.   Psychiatric:         Mood and Affect: Mood normal.         Behavior: Behavior normal.         Thought Content: Thought content normal.            Results for orders placed or performed in visit on 09/02/23 (from the past 24 hour(s))   Comprehensive Metabolic Panel   Result Value Ref Range    Sodium 140 136 - 145 mmol/L    Potassium 4.4 3.4 - 5.3 mmol/L    Chloride 105 98 - 107 mmol/L    Carbon Dioxide (CO2) 26 22 - 29 mmol/L    Anion Gap 9 7 - 15 mmol/L    Urea Nitrogen 12.5 6.0 - 20.0 mg/dL    Creatinine 0.84 0.51 - 0.95 mg/dL    Calcium 9.6 8.6 - 10.0 mg/dL    Glucose 106 (H) 70 - 99 mg/dL    Alkaline " Phosphatase 70 35 - 104 U/L    AST 15 0 - 45 U/L    ALT 9 0 - 50 U/L    Protein Total 7.3 6.4 - 8.3 g/dL    Albumin 4.6 3.5 - 5.2 g/dL    Bilirubin Total 0.6 <=1.2 mg/dL    GFR Estimate 88 >60 mL/min/1.73m2   CBC and Differential    Narrative    The following orders were created for panel order CBC and Differential.  Procedure                               Abnormality         Status                     ---------                               -----------         ------                     CBC with platelets and d...[[id number]]  Abnormal            Final result                 Please view results for these tests on the individual orders.   Lipase   Result Value Ref Range    Lipase 30 13 - 60 U/L   CBC with platelets and differential   Result Value Ref Range    WBC Count 19.1 (H) 4.0 - 11.0 10e3/uL    RBC Count 4.86 3.80 - 5.20 10e6/uL    Hemoglobin 14.9 11.7 - 15.7 g/dL    Hematocrit 44.1 35.0 - 47.0 %    MCV 91 78 - 100 fL    MCH 30.7 26.5 - 33.0 pg    MCHC 33.8 31.5 - 36.5 g/dL    RDW 12.3 10.0 - 15.0 %    Platelet Count 296 150 - 450 10e3/uL    % Neutrophils 93 %    % Lymphocytes 3 %    % Monocytes 4 %    % Eosinophils 0 %    % Basophils 0 %    % Immature Granulocytes 0 %    NRBCs per 100 WBC 0 <1 /100    Absolute Neutrophils 17.6 (H) 1.6 - 8.3 10e3/uL    Absolute Lymphocytes 0.5 (L) 0.8 - 5.3 10e3/uL    Absolute Monocytes 0.8 0.0 - 1.3 10e3/uL    Absolute Eosinophils 0.0 0.0 - 0.7 10e3/uL    Absolute Basophils 0.0 0.0 - 0.2 10e3/uL    Absolute Immature Granulocytes 0.1 <=0.4 10e3/uL    Absolute NRBCs 0.0 10e3/uL   Extra Tube    Narrative    The following orders were created for panel order Extra Tube.  Procedure                               Abnormality         Status                     ---------                               -----------         ------                     Extra Serum Separator Tu...[[id number]]                      In process                   Please view results for these tests on the individual  orders.   Pregnancy, Urine (HCG)   Result Value Ref Range    hCG Urine Qualitative Negative Negative   UA reflex to Microscopic   Result Value Ref Range    Color Urine Yellow Colorless, Straw, Light Yellow, Yellow    Appearance Urine Clear Clear    Glucose Urine Negative Negative mg/dL    Bilirubin Urine Negative Negative    Ketones Urine Negative Negative mg/dL    Specific Gravity Urine 1.025 1.000 - 1.030    Blood Urine Negative Negative    pH Urine 5.5 5.0 - 9.0    Protein Albumin Urine Negative Negative mg/dL    Urobilinogen Urine Normal Normal, 2.0 mg/dL    Nitrite Urine Negative Negative    Leukocyte Esterase Urine Negative Negative    Narrative    Microscopic not indicated

## 2023-09-06 ASSESSMENT — PATIENT HEALTH QUESTIONNAIRE - PHQ9
SUM OF ALL RESPONSES TO PHQ QUESTIONS 1-9: 13
SUM OF ALL RESPONSES TO PHQ QUESTIONS 1-9: 13
10. IF YOU CHECKED OFF ANY PROBLEMS, HOW DIFFICULT HAVE THESE PROBLEMS MADE IT FOR YOU TO DO YOUR WORK, TAKE CARE OF THINGS AT HOME, OR GET ALONG WITH OTHER PEOPLE: SOMEWHAT DIFFICULT

## 2023-09-07 ENCOUNTER — OFFICE VISIT (OUTPATIENT)
Dept: FAMILY MEDICINE | Facility: OTHER | Age: 43
End: 2023-09-07
Attending: PHYSICIAN ASSISTANT
Payer: COMMERCIAL

## 2023-09-07 VITALS
TEMPERATURE: 98 F | OXYGEN SATURATION: 96 % | WEIGHT: 143 LBS | RESPIRATION RATE: 20 BRPM | BODY MASS INDEX: 22.98 KG/M2 | SYSTOLIC BLOOD PRESSURE: 118 MMHG | DIASTOLIC BLOOD PRESSURE: 60 MMHG | HEIGHT: 66 IN | HEART RATE: 76 BPM

## 2023-09-07 DIAGNOSIS — R10.12 ABDOMINAL PAIN, LEFT UPPER QUADRANT: ICD-10-CM

## 2023-09-07 DIAGNOSIS — K59.00 CONSTIPATION, UNSPECIFIED CONSTIPATION TYPE: Primary | ICD-10-CM

## 2023-09-07 PROCEDURE — 99214 OFFICE O/P EST MOD 30 MIN: CPT | Performed by: PHYSICIAN ASSISTANT

## 2023-09-07 ASSESSMENT — PAIN SCALES - GENERAL: PAINLEVEL: NO PAIN (0)

## 2023-09-07 NOTE — NURSING NOTE
"Chief Complaint   Patient presents with    Follow Up     [facility] 09/02/23- abdominal pain / constipation    Patient is here to follow up from [facility] having bloating and diarrhea .     Initial /60   Pulse 76   Temp 98  F (36.7  C) (Tympanic)   Resp 20   Ht 1.664 m (5' 5.5\")   Wt 64.9 kg (143 lb)   LMP 08/14/2023 (Approximate)   SpO2 96%   BMI 23.43 kg/m   Estimated body mass index is 23.43 kg/m  as calculated from the following:    Height as of this encounter: 1.664 m (5' 5.5\").    Weight as of this encounter: 64.9 kg (143 lb).  Medication Reconciliation: complete    Bonnie Sam LPN    "

## 2023-09-07 NOTE — PATIENT INSTRUCTIONS
Try small amounts of food and drink frequently. Offer a bland diet. Advance as tolerated. Avoid dairy products the first day. You may add yogurt tomorrow as the first dairy product.    Try the BRAT diet (bread, bananas, rice, applesauce, tea, toast).  This is a very bland diet which should not irritate your colon.  Hold off on spicy foods, red sauces, mexican or chinese food.    Call or return to clinic as needed if your symptoms worsen or fail to improve as anticipated.     If the pain does not begin improving, localizes to the right lower belly, there is increased fever, or other progression of symptoms, return for reassessment.    Should I see a doctor or nurse about my stomach ache? -- Most people do not need to see a doctor or nurse for a stomach ache. But you should see your doctor or nurse if:  ?You have bloody bowel movements, diarrhea, or vomiting  ?Your pain is severe and lasts more than an hour or comes and goes for more than 24 hours  ?You cannot eat or drink for hours  ?You have a fever higher than 102 F (39 C)  ?You lose a lot of weight without trying to, or lose interest in food     
9

## 2023-09-07 NOTE — PROGRESS NOTES
Assessment & Plan   Problem List Items Addressed This Visit    None  Visit Diagnoses       Constipation, unspecified constipation type    -  Primary    Abdominal pain, left upper quadrant               Discussed symptoms and concerns at length.  Exam is unremarkable.  We will continue MiraLAX.  Encouraged to take every other day due to recurrent diarrhea.  Continue antibiotics as previously prescribed.  Encouraged to start a high-fiber diet after 2 to 3 weeks when she is feeling better.  Gave patient education.  Encourage close follow-up if symptoms or not improving or worsening.  May need to complete an abdominal/pelvic CT if symptoms are persistent or worsening.  Gave warning signs and symptoms.    30 minutes spent by me on the date of the encounter doing chart review, history and exam, documentation and further activities per the note       Depression Screening Follow Up        9/6/2023     2:32 PM   PHQ   PHQ-9 Total Score 13   Q9: Thoughts of better off dead/self-harm past 2 weeks Not at all         9/6/2023     2:32 PM   Last PHQ-9   1.  Little interest or pleasure in doing things 3   2.  Feeling down, depressed, or hopeless 1   3.  Trouble falling or staying asleep, or sleeping too much 2   4.  Feeling tired or having little energy 2   5.  Poor appetite or overeating 2   6.  Feeling bad about yourself 1   7.  Trouble concentrating 2   8.  Moving slowly or restless 0   Q9: Thoughts of better off dead/self-harm past 2 weeks 0   PHQ-9 Total Score 13       Follow Up Actions Taken  Patient counseled, no additional follow up at this time.     See Patient Instructions    No follow-ups on file.    Gloria Laura PA-C  [facility] is a 42 year old, presenting for the following health issues:  Follow Up ([facility] 09/02/23- abdominal pain / constipation )        9/7/2023    12:49 PM   Additional Questions   Roomed by Mi JOHNSON LPN   Accompanied by self       History of  Present Illness       Reason for visit:  GI issues/Constipation/ intestinal blockage    She eats 2-3 servings of fruits and vegetables daily.She consumes 1 sweetened beverage(s) daily.She exercises with enough effort to increase her heart rate 20 to 29 minutes per day.  She exercises with enough effort to increase her heart rate 6 days per week.   She is taking medications regularly.         Patient been struggling with persistent stomach issues.  Initially seen in the rapid clinic on 8/25/2023.  Had been struggling with constipation for 3 weeks despite adding over-the-counter remedies, Benefiber, and probiotics.  No prior history of inflammatory bowel disease or chronic constipation.  Did briefly use MiraLAX, Dulcolax, and an enema a few weeks prior.  X-ray showed a moderate amount of stool throughout the right side and transverse colon.  Encouraged to increase MiraLAX to twice daily.  Patient was also struggling with an acute hemorrhoid.  Was encouraged to use Preparation H cream as needed for symptomatic relief.  Was then seen for recheck on 9/2/2023.  Woke up that morning with emesis 2 times along with feeling fatigued and tired.  Patient was found to have an elevated white blood cell count and elevated absolute neutrophil count.  Vital signs are stable.  Patient was started on Augmentin and metronidazole for possible diverticulitis.  Also given Zofran to use as needed.    Patient states that she ended up getting thicker diarrhea after starting the stool softeners and laxatives.  Started to have vomiting and diarrhea last week.  Diagnosed with diverticulitis and treated with Augmentin and metronidazole.  Antibiotics have helped the abdominal pain a little.  She is not having liquid diarrhea once daily.  Still on the antibiotics.  No vomiting.  Still feeling a little bloated.  Has [place] medication in her stomach.  Her stomach has been very noisy after starting laxatives.  Currently taking MiraLAX once daily.   "Has always eaten a lot of cheese and nuts.  Thinks he may have increased this recently however she is unsure if this is contributing to her symptoms.  No current fevers, chills, nausea, vomiting, blood in stool or urine, urinary symptoms, cough or cold symptoms.  States that his symptoms are very slowly improving.  Unsure if she needs to any further work-up or treatment.    Review of Systems   Constitutional, HEENT, cardiovascular, pulmonary, gi and gu systems are negative, except as otherwise noted.      Objective    /60   Pulse 76   Temp 98  F (36.7  C) (Tympanic)   Resp 20   Ht 1.664 m (5' 5.5\")   Wt 64.9 kg (143 lb)   LMP 08/14/2023 (Approximate)   SpO2 96%   BMI 23.43 kg/m    Body mass index is 23.43 kg/m .  Physical Exam  Vitals and nursing note reviewed.   Constitutional:       General: She is not in acute distress.     Appearance: Normal appearance. She is well-developed.   Cardiovascular:      Rate and Rhythm: Normal rate and regular rhythm.      Heart sounds: Normal heart sounds, S1 normal and S2 normal. No murmur heard.  Pulmonary:      Effort: Pulmonary effort is normal. No respiratory distress.      Breath sounds: Normal breath sounds. No wheezing or rales.   Abdominal:      General: Abdomen is flat. Bowel sounds are normal.      Palpations: Abdomen is soft. There is no mass.      Tenderness: There is no abdominal tenderness. There is no right CVA tenderness, left CVA tenderness, guarding or rebound.      Hernia: No hernia is present.   Musculoskeletal:         General: Normal range of motion.   Skin:     General: Skin is warm and dry.      Findings: No rash.   Neurological:      General: No focal deficit present.      Mental Status: She is alert and oriented to person, place, and time.   Psychiatric:         Mood and Affect: Mood normal.         Behavior: Behavior normal.         Thought Content: Thought content normal.         Judgment: Judgment normal.                          "

## 2024-02-07 ENCOUNTER — MYC REFILL (OUTPATIENT)
Dept: FAMILY MEDICINE | Facility: OTHER | Age: 44
End: 2024-02-07
Payer: COMMERCIAL

## 2024-02-07 DIAGNOSIS — F41.9 ANXIETY: ICD-10-CM

## 2024-02-09 RX ORDER — ALPRAZOLAM 0.5 MG
0.5 TABLET ORAL 2 TIMES DAILY PRN
Qty: 10 TABLET | Refills: 0 | Status: SHIPPED | OUTPATIENT
Start: 2024-02-09

## 2024-02-09 NOTE — TELEPHONE ENCOUNTER
[id number] in [employer] [facility] [place] sent Rx request for the following:      Requested Prescriptions   Pending Prescriptions Disp Refills    ALPRAZolam (XANAX) 0.5 MG tablet 10 tablet 0     Sig: Take 1 tablet (0.5 mg) by mouth 2 times daily as needed for anxiety       There is no refill protocol information for this order          Last Prescription Date:   2/10/23  Last Fill Qty/Refills:         10, R-0    Last Office Visit:              9/7/23   Future Office visit:           none      Routing refill request to provider for review/approval because:  Drug not on the [facility] refill protocol   Patient needs to be seen because:  more than 6 months since last office visit.    Will route to [address] schedulers for annual wellness appointment.   Marcie Boone RN on 2/9/2024 at 10:53 AM

## 2024-02-09 NOTE — TELEPHONE ENCOUNTER
Refilled Rx.   Needs to be seen for recheck prior to future refills.   Gloria Laura PA-C ..................2/9/2024 12:14 PM       PDMP Review         Value Time User    State PDMP site checked  Yes 2/9/2024 12:13 PM Gloria Laura PA-C

## 2024-02-12 ENCOUNTER — OFFICE VISIT (OUTPATIENT)
Dept: FAMILY MEDICINE | Facility: OTHER | Age: 44
End: 2024-02-12
Attending: PHYSICIAN ASSISTANT
Payer: COMMERCIAL

## 2024-02-12 VITALS
SYSTOLIC BLOOD PRESSURE: 138 MMHG | HEIGHT: 66 IN | DIASTOLIC BLOOD PRESSURE: 76 MMHG | HEART RATE: 81 BPM | OXYGEN SATURATION: 98 % | RESPIRATION RATE: 14 BRPM | WEIGHT: 150 LBS | TEMPERATURE: 97.7 F | BODY MASS INDEX: 24.11 KG/M2

## 2024-02-12 DIAGNOSIS — R19.09 GROIN LUMP: Primary | ICD-10-CM

## 2024-02-12 PROBLEM — Z30.42 DEPO-PROVERA CONTRACEPTIVE STATUS: Status: RESOLVED | Noted: 2017-12-12 | Resolved: 2024-02-12

## 2024-02-12 PROBLEM — R61 GENERALIZED HYPERHIDROSIS: Status: RESOLVED | Noted: 2018-01-24 | Resolved: 2024-02-12

## 2024-02-12 PROCEDURE — 99214 OFFICE O/P EST MOD 30 MIN: CPT | Performed by: PHYSICIAN ASSISTANT

## 2024-02-12 ASSESSMENT — PAIN SCALES - GENERAL: PAINLEVEL: NO PAIN (0)

## 2024-02-12 NOTE — PROGRESS NOTES
Assessment & Plan     1. Groin lump  Chronic, progressing in size. Symptoms and exam most concerning for inguinal hernia.  Reviewed CT of abdomen pelvis that did not show any acute findings.  Will pursue ultrasound for additional evaluation given lump has been growing.  - US Hernia Evaluation; Future      No follow-ups on file.    Subjective   Abril is a 43 year old, presenting for the following health issues:  Mass    History of Present Illness       Reason for visit:  Possible hernia  Symptoms include:  Bulge at top of groin, tugging sensation  Symptom intensity:  Mild  Symptom progression:  Worsening  Had these symptoms before:  Yes  Has tried/received treatment for these symptoms:  No    She eats 2-3 servings of fruits and vegetables daily.She consumes 1 sweetened beverage(s) daily.She exercises with enough effort to increase her heart rate 30 to 60 minutes per day.  She exercises with enough effort to increase her heart rate 5 days per week.   She is taking medications regularly.     Here for evaluation of possible groin hernia.  Patient reports she noticed a small lump in her left groin approximately 1 year ago.  Over the last few months it has been getting bigger.  She had been struggling some constipation symptoms this past fall.  She did have CT of her abdomen/pelvis completed in October that was largely unremarkable.  Since then the lump has been growing and now has somewhat of a tugging sensation.  No significant tenderness, overlying skin changes.  No significant associate abdominal pain, nausea, vomiting, diarrhea, no constipation, blood in stool.      PAST MEDICAL HISTORY:   Past Medical History:   Diagnosis Date    History of cervical dysplasia     2002       PAST SURGICAL HISTORY:   Past Surgical History:   Procedure Laterality Date    ARTHROSCOPY KNEE      2007,right    COMBINED CYSTOSCOPY, URETEROSCOPY, LASER HOLMIUM LITHOTRIPSY URETER(S) Left 11/29/2022    Procedure: Left ureteroscopy with  "laser lithotripsy and stent exchange;  Surgeon: Samir Phillips MD;  Location: [facility] OR    CONIZATION LEE      2002    CYSTOSCOPY, RETROGRADES, INSERT STENT URETER(S), COMBINED Left 11/15/2022    Procedure: Left retrograde pyelogram and stent placement;  Surgeon: Samir Phillips MD;  Location: [facility] OR       FAMILY HISTORY:   Family History   Problem Relation Age of Onset    Other - See Comments Mother         MS in remission    Nephrolithiasis Mother     Nephrolithiasis Father     Other - See Comments Sister         Endometriosis    Nephrolithiasis Brother     Cancer Maternal Grandmother         Cancer    Cancer Maternal Grandfather         Cancer    Cancer Paternal Grandmother 95        Cancer    Cancer Paternal Grandfather 97        Cancer    Heart Disease Son         Heart Disease, Bicuspid aortic valve       SOCIAL HISTORY:   Social History     Tobacco Use    Smoking status: Every Day     Packs/day: 1.00     Years: 16.00     Additional pack years: 0.00     Total pack years: 16.00     Types: Cigarettes    Smokeless tobacco: Never    Tobacco comments:     Quit smoking: has been trying   Substance Use Topics    Alcohol use: Yes     Alcohol/week: 0.0 standard drinks of alcohol     Comment: Alcoholic Drinks/day: occasionally, maybe four per month      No Known Allergies  Current Outpatient Medications   Medication    ALPRAZolam (XANAX) 0.5 MG tablet    buPROPion (WELLBUTRIN XL) 150 MG 24 hr tablet    docusate sodium (COLACE) 100 MG capsule    Multiple Vitamins-Minerals (WOMENS MULTI GUMMIES) CHEW    ondansetron (ZOFRAN) 4 MG tablet    polyethylene glycol (MIRALAX) 17 GM/Dose powder    Probiotic Product (PROBIOTIC GUMMIES PO)    SENNA-docusate sodium (SENNA S) 8.6-50 MG tablet     No current facility-administered medications for this visit.         Review of Systems  Per HPI        Objective    /76   Pulse 81   Temp 97.7  F (36.5  C)   Resp 14   Ht 1.664 m (5' 5.5\")   Wt 68 kg (150 lb)   LMP 01/29/2024 (Exact " Date)   SpO2 98%   BMI 24.58 kg/m    Body mass index is 24.58 kg/m .  Physical Exam   General: Pleasant, in no apparent distress.  Abdomen: 2 cm x 4 cm visible and palpable lump in left inguinal region that is nontender.  Reducible.  No overlying skin changes, warmth, drainage.  Psych: Appropriate mood and affect.          Signed Electronically by: Deyanira Wayne PA-C

## 2024-02-12 NOTE — NURSING NOTE
Patient presents to clinic with concerns about having a possible hernia in groin area. She has had a lump on left side x 1 year.  Berta Jenkins, MACIEJ ....................  2/12/2024   2:15 PM  EXT [id number]

## 2024-03-04 ENCOUNTER — HOSPITAL ENCOUNTER (OUTPATIENT)
Dept: ULTRASOUND IMAGING | Facility: OTHER | Age: 44
Discharge: HOME | End: 2024-03-04
Attending: PHYSICIAN ASSISTANT | Admitting: PHYSICIAN ASSISTANT
Payer: COMMERCIAL

## 2024-03-04 DIAGNOSIS — R19.09 GROIN LUMP: ICD-10-CM

## 2024-03-04 PROCEDURE — 76705 ECHO EXAM OF ABDOMEN: CPT

## 2024-03-20 ENCOUNTER — MYC MEDICAL ADVICE (OUTPATIENT)
Dept: FAMILY MEDICINE | Facility: OTHER | Age: 44
End: 2024-03-20
Payer: COMMERCIAL

## 2024-03-20 DIAGNOSIS — K40.90 INGUINAL HERNIA WITHOUT OBSTRUCTION OR GANGRENE, RECURRENCE NOT SPECIFIED, UNSPECIFIED LATERALITY: Primary | ICD-10-CM

## 2024-08-28 ENCOUNTER — OFFICE VISIT (OUTPATIENT)
Dept: FAMILY MEDICINE | Facility: OTHER | Age: 44
End: 2024-08-28
Attending: PHYSICIAN ASSISTANT
Payer: COMMERCIAL

## 2024-08-28 VITALS
SYSTOLIC BLOOD PRESSURE: 136 MMHG | BODY MASS INDEX: 22.39 KG/M2 | OXYGEN SATURATION: 99 % | HEART RATE: 69 BPM | TEMPERATURE: 97.5 F | DIASTOLIC BLOOD PRESSURE: 72 MMHG | WEIGHT: 136.6 LBS

## 2024-08-28 DIAGNOSIS — Z72.0 TOBACCO ABUSE: ICD-10-CM

## 2024-08-28 DIAGNOSIS — K40.90 INGUINAL HERNIA WITHOUT OBSTRUCTION OR GANGRENE, RECURRENCE NOT SPECIFIED, UNSPECIFIED LATERALITY: ICD-10-CM

## 2024-08-28 DIAGNOSIS — Z01.818 PREOP GENERAL PHYSICAL EXAM: Primary | ICD-10-CM

## 2024-08-28 PROBLEM — N13.30 HYDRONEPHROSIS: Status: RESOLVED | Noted: 2022-11-15 | Resolved: 2024-08-28

## 2024-08-28 PROBLEM — N39.0 URINARY TRACT INFECTION: Status: RESOLVED | Noted: 2022-11-15 | Resolved: 2024-08-28

## 2024-08-28 PROBLEM — N20.1 LEFT URETERAL STONE: Status: RESOLVED | Noted: 2022-11-15 | Resolved: 2024-08-28

## 2024-08-28 PROBLEM — N39.0 URINARY TRACT INFECTION WITHOUT HEMATURIA, SITE UNSPECIFIED: Status: RESOLVED | Noted: 2022-11-16 | Resolved: 2024-08-28

## 2024-08-28 PROBLEM — Z11.52 ENCOUNTER FOR SCREENING LABORATORY TESTING FOR COVID-19 VIRUS: Status: RESOLVED | Noted: 2022-11-16 | Resolved: 2024-08-28

## 2024-08-28 LAB
HCG UR QL: NEGATIVE
HGB BLD-MCNC: 13.5 G/DL (ref 11.7–15.7)

## 2024-08-28 PROCEDURE — 81025 URINE PREGNANCY TEST: CPT | Mod: ZL | Performed by: PHYSICIAN ASSISTANT

## 2024-08-28 PROCEDURE — 99214 OFFICE O/P EST MOD 30 MIN: CPT | Performed by: PHYSICIAN ASSISTANT

## 2024-08-28 PROCEDURE — 85018 HEMOGLOBIN: CPT | Mod: ZL | Performed by: PHYSICIAN ASSISTANT

## 2024-08-28 PROCEDURE — 36415 COLL VENOUS BLD VENIPUNCTURE: CPT | Mod: ZL | Performed by: PHYSICIAN ASSISTANT

## 2024-08-28 NOTE — NURSING NOTE
"Chief Complaint   Patient presents with    Pre-Op Exam     Pre-op Inguinal Hernia repair  Initial /72   Pulse 69   Temp 97.5  F (36.4  C) (Tympanic)   Wt 62 kg (136 lb 9.6 oz)   LMP 08/16/2024 (Exact Date)   SpO2 99%   BMI 22.39 kg/m   Estimated body mass index is 22.39 kg/m  as calculated from the following:    Height as of 2/12/24: 1.664 m (5' 5.5\").    Weight as of this encounter: 62 kg (136 lb 9.6 oz).  Medication Review: complete    The next two questions are to help us understand your food security.  If you are feeling you need any assistance in this area, we have resources available to support you today.          2/12/2024   SDOH- Food Insecurity   Within the past 12 months, did you worry that your food would run out before you got money to buy more? N   Within the past 12 months, did the food you bought just not last and you didn t have money to get more? N            Health Care Directive:  Patient does not have a Health Care Directive or Living Will: Discussed advance care planning with patient; however, patient declined at this time.    Taina Roldan MA      "

## 2024-08-28 NOTE — PROGRESS NOTES
Preoperative Evaluation  [facility]  [address]  [place] [address]  Phone: [phone]  Fax: [phone]  Primary Provider: Gloria Laura PA-C  Pre-op Performing Provider: Gloria Laura PA-C  Aug 28, 2024             8/28/2024   Surgical Information   What procedure is being done? Robotic Left (Possible Right as well) Inguinal Hernia Repair Robotic Left (Possible Right as well) Inguinal Hernia Repair   Facility or Hospital where procedure/surgery will be performed: [facility]   Who is doing the procedure / surgery? Dr. Zak Curry   Date of surgery / procedure: 9/5/2024 9/5/2024   Time of surgery / procedure: TBD n/a   Where do you plan to recover after surgery? home at home with family        Fax number for surgical facility: [phone]    Assessment & Plan     The proposed surgical procedure is considered INTERMEDIATE risk.    Problem List Items Addressed This Visit          Behavioral    Tobacco abuse     Other Visit Diagnoses       Preop general physical exam    -  Primary    Relevant Orders    Hemoglobin (Completed)    Pregnancy, Urine (HCG) (Completed)    Inguinal hernia without obstruction or gangrene, recurrence not specified, unspecified laterality              Preoperative clearance, inguinal hernia: Complete hemoglobin and UPT for preoperative clearance.  Labs are normal.  No acute concerns at this time prior to surgery.  Patient is approved for surgery.    Tobacco abuse: Highly stressed need to quit smoking.  Declines quitting at this time.  Recheck as needed.     Risks and Recommendations  The patient has the following additional risks and recommendations for perioperative complications:   - No identified additional risk factors other than previously addressed    Antiplatelet or Anticoagulation Medication Instructions   - Patient is on no antiplatelet or anticoagulation medications.    Additional Medication Instructions  Take all scheduled  medications on the day of surgery    Recommendation  Approval given to proceed with proposed procedure, without further diagnostic evaluation.    Gurmeet Samuels is a 43 year old, presenting for the following:  Pre-Op Exam          8/28/2024    10:50 AM   Additional Questions   Roomed by Taina MENSAH related to upcoming procedure:     Patient is had a left inguinal hernia over the last year.  Noticed on the left side.  Stomach issues over the last year.  Feels like the hernia is getting larger.  Diagnosed with an ultrasound.  Getting more uncomfortable.  No blood in the stool, constipation, diarrhea, dysuria, frequency, urgency, fevers, chills, chest pain, palpitations, or problems breathing.  They are also going to rule out a right inguinal hernia during the surgery to ensure it does not need to repaired as well.        8/28/2024   Pre-Op Questionnaire   Have you ever had a heart attack or stroke? No   Have you ever had surgery on your heart or blood vessels, such as a stent placement, a coronary artery bypass, or surgery on an artery in your head, neck, heart, or legs? No   Do you have chest pain with activity? No   Do you have a history of heart failure? No   Do you currently have a cold, bronchitis or symptoms of other infection? No   Do you have a cough, shortness of breath, or wheezing? No   Do you or anyone in your family have previous history of blood clots? No   Do you or does anyone in your family have a serious bleeding problem such as prolonged bleeding following surgeries or cuts? No   Have you ever had problems with anemia or been told to take iron pills? No   Have you had any abnormal blood loss such as black, tarry or bloody stools, or abnormal vaginal bleeding? No   Have you ever had a blood transfusion? No   Are you willing to have a blood transfusion if it is medically needed before, during, or after your surgery? Yes   Have you or any of your relatives ever had problems with  anesthesia? No   Do you have sleep apnea, excessive snoring or daytime drowsiness? No   Do you have any artifical heart valves or other implanted medical devices like a pacemaker, defibrillator, or continuous glucose monitor? No   Do you have artificial joints? No   Are you allergic to latex? No        Health Care Directive  Patient does not have a Health Care Directive or Living Will: Discussed advance care planning with patient; information given to patient to review.    Preoperative Review of    reviewed - controlled substances reflected in medication list.      Status of Chronic Conditions:  Tobacco abuse: Patient is currently a tobacco user.  Declines quitting at this time.  Asymptomatic.    Patient has tolerated surgery well in the past.    Patient has no recent cough or cold symptoms.  No recent fevers, chills, sore throat, ear pain, chest pain, palpitations, problems breathing, stomachaches, nausea, vomiting, diarrhea, constipation, blood in stool or urine, dysuria, frequency, urgency.    Patient can walk up a flight of stairs without becoming short of breath or having chest pain: YES   Patient is able to tolerate greater than 4 METs of activity without any cardiopulmonary symptoms.    Patient Active Problem List    Diagnosis Date Noted    Controlled substance agreement signed 02/10/2023     Priority: Medium     Patient is followed by POOJA LOCKE for ongoing prescription of benzodiazepines.  All refills should be approved by this provider, or covering partner.    Medication(s): alprazolam.   Maximum quantity per month: 10 per year  Clinic visit frequency required: yearly physical checkups     Controlled substance agreement on file: Yes       Date(s): 2/10/23  Benzodiazepine use reviewed by psychiatry:  No    Last [facility] website verification:  done on 2/10/23  [email or web address]          Leukocytosis 11/15/2022     Priority: Medium    Tobacco abuse 01/24/2018     Priority: Medium       Past Medical History:   Diagnosis Date    History of cervical dysplasia     2002    Left ureteral stone 11/15/2022     Past Surgical History:   Procedure Laterality Date    ARTHROSCOPY KNEE      2007,right    COMBINED CYSTOSCOPY, URETEROSCOPY, LASER HOLMIUM LITHOTRIPSY URETER(S) Left 11/29/2022    Procedure: Left ureteroscopy with laser lithotripsy and stent exchange;  Surgeon: Samir Phillips MD;  Location:  OR    CONIZATION LEEP      2002    CYSTOSCOPY, RETROGRADES, INSERT STENT URETER(S), COMBINED Left 11/15/2022    Procedure: Left retrograde pyelogram and stent placement;  Surgeon: Samir Phillips MD;  Location:  OR     Current Outpatient Medications   Medication Sig Dispense Refill    ALPRAZolam (XANAX) 0.5 MG tablet Take 1 tablet (0.5 mg) by mouth 2 times daily as needed for anxiety 10 tablet 0    docusate sodium (COLACE) 100 MG capsule Take 1 capsule (100 mg) by mouth 2 times daily 30 capsule 0    polyethylene glycol (MIRALAX) 17 GM/Dose powder Take 17 g (1 Capful) by mouth 2 times daily 510 g 0    SENNA-docusate sodium (SENNA S) 8.6-50 MG tablet Take 1 tablet by mouth At Bedtime 30 tablet 0       No Known Allergies     Social History     Tobacco Use    Smoking status: Every Day     Current packs/day: 1.00     Average packs/day: 1 pack/day for 16.0 years (16.0 ttl pk-yrs)     Types: Cigarettes     Passive exposure: Current    Smokeless tobacco: Never    Tobacco comments:     Quit smoking: has been trying   Substance Use Topics    Alcohol use: Yes     Alcohol/week: 0.0 standard drinks of alcohol     Comment: Alcoholic Drinks/day: occasionally, maybe four per month     Family History   Problem Relation Age of Onset    Other - See Comments Mother         MS in remission    Nephrolithiasis Mother     Nephrolithiasis Father     Other - See Comments Sister         Endometriosis    Nephrolithiasis Brother     Cancer Maternal Grandmother         Cancer    Cancer Maternal Grandfather         Cancer    Cancer  "Paternal Grandmother 95        Cancer    Cancer Paternal Grandfather 97        Cancer    Heart Disease Son         Heart Disease, Bicuspid aortic valve     History   Drug Use Unknown             Review of Systems  Constitutional, neuro, ENT, endocrine, pulmonary, cardiac, gastrointestinal, genitourinary, musculoskeletal, integument and psychiatric systems are negative, except as otherwise noted.    Objective    /72   Pulse 69   Temp 97.5  F (36.4  C) (Tympanic)   Wt 62 kg (136 lb 9.6 oz)   LMP 08/16/2024 (Exact Date)   SpO2 99%   BMI 22.39 kg/m     Estimated body mass index is 22.39 kg/m  as calculated from the following:    Height as of 2/12/24: 1.664 m (5' 5.5\").    Weight as of this encounter: 62 kg (136 lb 9.6 oz).  Physical Exam  GENERAL: alert and no distress  EYES: Eyes grossly normal to inspection, PERRL and conjunctivae and sclerae normal  HENT: ear canals and TM's normal, nose and mouth without ulcers or lesions  NECK: no adenopathy, no asymmetry, masses, or scars  RESP: lungs clear to auscultation - no rales, rhonchi or wheezes  CV: regular rate and rhythm, normal S1 S2, no S3 or S4, no murmur, click or rub, no peripheral edema  ABDOMEN: soft, nontender, no hepatosplenomegaly, no masses and bowel sounds normal  MS: no gross musculoskeletal defects noted, no edema  SKIN: no suspicious lesions or rashes  NEURO: Normal strength and tone, mentation intact and speech normal  PSYCH: mentation appears normal, affect normal/bright    Recent Labs   Lab Test 09/02/23  1323   HGB 14.9         POTASSIUM 4.4   CR 0.84        Diagnostics  Recent Results (from the past 720 hour(s))   Pregnancy, Urine (HCG)    Collection Time: 08/28/24 11:22 AM   Result Value Ref Range    hCG Urine Qualitative Negative Negative   Hemoglobin    Collection Time: 08/28/24 11:25 AM   Result Value Ref Range    Hemoglobin 13.5 11.7 - 15.7 g/dL      No EKG required, no history of coronary heart disease, significant " arrhythmia, peripheral arterial disease or other structural heart disease.    Revised Cardiac Risk Index (RCRI)  The patient has the following serious cardiovascular risks for perioperative complications:   - No serious cardiac risks = 0 points     RCRI Interpretation: 0 points: Class I (very low risk - 0.4% complication rate)         Signed Electronically by: Gloria Laura PA-C  A copy of this evaluation report is provided to the requesting physician.

## 2024-08-28 NOTE — PATIENT INSTRUCTIONS
Patient should take the following medications the morning of surgery with a small sip of water: hold all meds.  Patient was instructed to hold the following medications the morning of surgery: hold all meds.     Patient was advised to call our office and the surgical services with any change in condition or new symptoms if they were to develop between today and their surgical date.  Especially any cardiopulmonary symptoms or symptoms concerning for an infection.     Discontinue aspirin, aleve, naproxen and ibuprofen 7 days prior to surgery to reduce bleeding risk.  It is fine to take tylenol the week before surgery.  Hold vitamins and herbal remedies for 14 days before surgery.     How to Take Your Medication Before Surgery  Preoperative Medication Instructions   Antiplatelet or Anticoagulation Medication Instructions   - Patient is on no antiplatelet or anticoagulation medications.    Additional Medication Instructions  Take all scheduled medications on the day of surgery       Patient Education   Preparing for Your Surgery  Getting started  A nurse will call you to review your health history and instructions. They will give you an arrival time based on your scheduled surgery time. Please be ready to share:  Your doctor's clinic name and phone number  Your medical, surgical, and anesthesia history  A list of allergies and sensitivities  A list of medicines, including herbal treatments and over-the-counter drugs  Whether the patient has a legal guardian (ask how to send us the papers in advance)  Please tell us if you're pregnant--or if there's any chance you might be pregnant. Some surgeries may injure a fetus (unborn baby), so they require a pregnancy test. Surgeries that are safe for a fetus don't always need a test, and you can choose whether to have one.   If you have a child who's having surgery, please ask for a copy of Preparing for Your Child's Surgery.    Preparing for surgery  Within 10 to 30 days of  surgery: Have a pre-op exam (sometimes called an H&P, or History and Physical). This can be done at a clinic or pre-operative center.  If you're having a , you may not need this exam. Talk to your care team.  At your pre-op exam, talk to your care team about all medicines you take. If you need to stop any medicines before surgery, ask when to start taking them again.  We do this for your safety. Many medicines can make you bleed too much during surgery. Some change how well surgery (anesthesia) drugs work.  Call your insurance company to let them know you're having surgery. (If you don't have insurance, call [phone].)  Call your clinic if there's any change in your health. This includes signs of a cold or flu (sore throat, runny nose, cough, rash, fever). It also includes a scrape or scratch near the surgery site.  If you have questions on the day of surgery, call your hospital or surgery center.  Eating and drinking guidelines  For your safety: Unless your surgeon tells you otherwise, follow the guidelines below.  Eat and drink as usual until 8 hours before you arrive for surgery. After that, no food or milk.  Drink clear liquids until 2 hours before you arrive. These are liquids you can see through, like water, Gatorade, and Propel Water. They also include plain black coffee and tea (no cream or milk), candy, and breath mints. You can spit out gum when you arrive.  If you drink alcohol: Stop drinking it the night before surgery.  If your care team tells you to take medicine on the morning of surgery, it's okay to take it with a sip of water.  Preventing infection  Shower or bathe the night before and morning of your surgery. Follow the instructions your clinic gave you. (If no instructions, use regular soap.)  Don't shave or clip hair near your surgery site. We'll remove the hair if needed.  Don't smoke or vape the morning of surgery. You may chew nicotine gum up to 2 hours before surgery. A nicotine  patch is okay.  Note: Some surgeries require you to completely quit smoking and nicotine. Check with your surgeon.  Your care team will make every effort to keep you safe from infection. We will:  Clean our hands often with soap and water (or an alcohol-based hand rub).  Clean the skin at your surgery site with a special soap that kills germs.  Give you a special gown to keep you warm. (Cold raises the risk of infection.)  Wear special hair covers, masks, gowns and gloves during surgery.  Give antibiotic medicine, if prescribed. Not all surgeries need antibiotics.  What to bring on the day of surgery  Photo ID and insurance card  Copy of your health care directive, if you have one  Glasses and hearing aids (bring cases)  You can't wear contacts during surgery  Inhaler and eye drops, if you use them (tell us about these when you arrive)  CPAP machine or breathing device, if you use them  A few personal items, if spending the night  If you have . . .  A pacemaker, ICD (cardiac defibrillator) or other implant: Bring the ID card.  An implanted stimulator: Bring the remote control.  A legal guardian: Bring a copy of the certified (court-stamped) guardianship papers.  Please remove any jewelry, including body piercings. Leave jewelry and other valuables at home.  If you're going home the day of surgery  You must have a responsible adult drive you home. They should stay with you overnight as well.  If you don't have someone to stay with you, and you aren't safe to go home alone, we may keep you overnight. Insurance often won't pay for this.  After surgery  If it's hard to control your pain or you need more pain medicine, please call your surgeon's office.  Questions?   If you have any questions for your care team, list them here: _________________________________________________________________________________________________________________________________________________________________________  ____________________________________ ____________________________________ ____________________________________  For informational purposes only. Not to replace the advice of your health care provider. Copyright   2003, 2019 [place]. All rights reserved. Clinically reviewed by Amanda Thomas MD. [employer] 988768 - REV 12/22.

## 2025-01-15 ENCOUNTER — OFFICE VISIT (OUTPATIENT)
Dept: FAMILY MEDICINE | Facility: OTHER | Age: 45
End: 2025-01-15
Attending: PHYSICIAN ASSISTANT
Payer: COMMERCIAL

## 2025-01-15 VITALS
DIASTOLIC BLOOD PRESSURE: 80 MMHG | WEIGHT: 139.6 LBS | BODY MASS INDEX: 22.43 KG/M2 | HEART RATE: 60 BPM | SYSTOLIC BLOOD PRESSURE: 132 MMHG | RESPIRATION RATE: 14 BRPM | OXYGEN SATURATION: 99 % | TEMPERATURE: 97 F | HEIGHT: 66 IN

## 2025-01-15 DIAGNOSIS — B07.8 OTHER VIRAL WARTS: Primary | ICD-10-CM

## 2025-01-15 ASSESSMENT — PAIN SCALES - GENERAL: PAINLEVEL_OUTOF10: NO PAIN (0)

## 2025-01-15 NOTE — PROGRESS NOTES
"  Assessment & Plan   Problem List Items Addressed This Visit    None  Visit Diagnoses       Other viral warts    -  Primary    Relevant Orders    DESTRUCT BENIGN LESION, UP TO 14 (Completed)           Warts were treated with liquid nitrogen. Patient should use compound W weekly over the next few weeks if warts persist.  Return in 2-4 weeks for repeat of treatment as necessary for persistent warts.  Please return to clinic if symptoms change or worsen.       Nicotine/Tobacco Cessation  She reports that she has been smoking cigarettes. She has a 16 pack-year smoking history. She has been exposed to tobacco smoke. She has never used smokeless tobacco.  Nicotine/Tobacco Cessation Plan  Information offered: Patient not interested at this time      See Patient Instructions    No follow-ups on file.      Subjective   Abril is a 44 year old, presenting for the following health issues:  Derm Problem        1/15/2025     3:12 PM   Additional Questions   Roomed by Patricia FRIEDMAN   Accompanied by self     History of Present Illness       Reason for visit:  Wart on leg  Symptom onset:  More than a month  Symptoms include:  Growing wart that comes back after removal  Symptom intensity:  Mild  Symptom progression:  Staying the same  Had these symptoms before:  No   She is taking medications regularly.     Patient has a wart on her right anterior lower thigh.  Has tried over-the-counter wart treatments which have been unsuccessful.  Keeps growing back.  Has tried freezing and Compound W over-the-counter.      Review of Systems  Constitutional, HEENT, cardiovascular, pulmonary, gi and gu systems are negative, except as otherwise noted.      Objective    /80 (BP Location: Right arm, Patient Position: Sitting, Cuff Size: Adult Regular)   Pulse 60   Temp 97  F (36.1  C) (Tympanic)   Resp 14   Ht 1.664 m (5' 5.5\")   Wt 63.3 kg (139 lb 9.6 oz)   LMP  (LMP Unknown)   SpO2 99%   BMI 22.88 kg/m    Body mass index is 22.88 " kg/m .  Physical Exam  Vitals and nursing note reviewed.   Constitutional:       Appearance: Normal appearance.   HENT:      Head: Normocephalic and atraumatic.   Musculoskeletal:         General: Normal range of motion.      Cervical back: Normal range of motion.   Skin:     General: Skin is warm and dry.      Findings: No rash.      Comments: Hardened wart appreciated on right anterior thigh approximately 8 x 8 mm in diameter.   Neurological:      General: No focal deficit present.      Mental Status: She is alert and oriented to person, place, and time.   Psychiatric:         Mood and Affect: Mood normal.         Behavior: Behavior normal.       The treatments, side effects and failure rates are discussed.  Wart was pared down with a #15 blade.    Liquid nitrogen was applied to each wart 3 times.  One wart treated. Patient tolerated the procedure well. The expected skin reaction including erythema, pain, scabbing, blistering and hypopigmented scar formation was discussed.  See at intervals until warts resolved.              Signed Electronically by: Gloria Laura PA-C

## 2025-01-15 NOTE — NURSING NOTE
"Chief Complaint   Patient presents with    Derm Problem       Initial /80 (BP Location: Right arm, Patient Position: Sitting, Cuff Size: Adult Regular)   Pulse 60   Temp 97  F (36.1  C) (Tympanic)   Resp 14   Ht 1.664 m (5' 5.5\")   Wt 63.3 kg (139 lb 9.6 oz)   LMP  (LMP Unknown)   SpO2 99%   BMI 22.88 kg/m   Estimated body mass index is 22.88 kg/m  as calculated from the following:    Height as of this encounter: 1.664 m (5' 5.5\").    Weight as of this encounter: 63.3 kg (139 lb 9.6 oz).  Medication Review: complete    The next two questions are to help us understand your food security.  If you are feeling you need any assistance in this area, we have resources available to support you today.          2/12/2024   SDOH- Food Insecurity   Within the past 12 months, did you worry that your food would run out before you got money to buy more? N   Within the past 12 months, did the food you bought just not last and you didn t have money to get more? N         Health Care Directive:  Patient does not have a Health Care Directive: Discussed advance care planning with patient; however, patient declined at this time.    Patricia Gamble      "

## 2025-05-17 ENCOUNTER — HEALTH MAINTENANCE LETTER (OUTPATIENT)
Age: 45
End: 2025-05-17

## 2025-06-07 ENCOUNTER — HEALTH MAINTENANCE LETTER (OUTPATIENT)
Age: 45
End: 2025-06-07

## (undated) DEVICE — Device

## (undated) DEVICE — GUIDEWIRE SENSOR DUAL FLEX STR 0.035"X150CM M0066703080

## (undated) DEVICE — PAD CHUX UNDERPAD 30X36" P3036C

## (undated) DEVICE — SUPERPULSED LASER FIBER 200

## (undated) DEVICE — SOL WATER 1500ML

## (undated) DEVICE — GLOVE PROTEXIS POWDER FREE SMT 8.5 2D72PT85X

## (undated) DEVICE — CATH URETERAL 5FRX70CM OPEN END FLEX TIP G14521

## (undated) DEVICE — TUOGHY BORST ADAPTER WITH SIDE ARM

## (undated) DEVICE — BASKET NITINOL TIPLESS HALO  1.5FRX120CM 554120

## (undated) DEVICE — SLEEVE COMPRESSION SCD KNEE MED 74022

## (undated) DEVICE — PACK CYSTO SBA15CSFCA

## (undated) DEVICE — CATH INTERMITTENT CLEAN-CATH 16FR 16" VINYL LF 421716

## (undated) DEVICE — GUIDEWIRE AMPLATZ SUPER STIFF .038"X145CM M0066401061

## (undated) RX ORDER — HYDROMORPHONE HYDROCHLORIDE 1 MG/ML
INJECTION, SOLUTION INTRAMUSCULAR; INTRAVENOUS; SUBCUTANEOUS
Status: DISPENSED
Start: 2022-11-15

## (undated) RX ORDER — CEFUROXIME AXETIL 250 MG/1
TABLET ORAL
Status: DISPENSED
Start: 2022-12-05

## (undated) RX ORDER — ONDANSETRON 2 MG/ML
INJECTION INTRAMUSCULAR; INTRAVENOUS
Status: DISPENSED
Start: 2022-11-15

## (undated) RX ORDER — CEFTRIAXONE SODIUM 2 G/50ML
INJECTION, SOLUTION INTRAVENOUS
Status: DISPENSED
Start: 2022-11-29

## (undated) RX ORDER — DEXAMETHASONE SODIUM PHOSPHATE 4 MG/ML
INJECTION, SOLUTION INTRA-ARTICULAR; INTRALESIONAL; INTRAMUSCULAR; INTRAVENOUS; SOFT TISSUE
Status: DISPENSED
Start: 2022-11-15

## (undated) RX ORDER — SCOLOPAMINE TRANSDERMAL SYSTEM 1 MG/1
PATCH, EXTENDED RELEASE TRANSDERMAL
Status: DISPENSED
Start: 2022-11-29

## (undated) RX ORDER — ACETAMINOPHEN 10 MG/ML
INJECTION, SOLUTION INTRAVENOUS
Status: DISPENSED
Start: 2022-11-15

## (undated) RX ORDER — FENTANYL CITRATE 50 UG/ML
INJECTION, SOLUTION INTRAMUSCULAR; INTRAVENOUS
Status: DISPENSED
Start: 2022-11-29

## (undated) RX ORDER — KETOROLAC TROMETHAMINE 30 MG/ML
INJECTION, SOLUTION INTRAMUSCULAR; INTRAVENOUS
Status: DISPENSED
Start: 2022-11-15

## (undated) RX ORDER — SCOLOPAMINE TRANSDERMAL SYSTEM 1 MG/1
PATCH, EXTENDED RELEASE TRANSDERMAL
Status: DISPENSED
Start: 2022-11-15

## (undated) RX ORDER — KETOROLAC TROMETHAMINE 30 MG/ML
INJECTION, SOLUTION INTRAMUSCULAR; INTRAVENOUS
Status: DISPENSED
Start: 2022-11-29

## (undated) RX ORDER — LIDOCAINE HYDROCHLORIDE 20 MG/ML
INJECTION, SOLUTION EPIDURAL; INFILTRATION; INTRACAUDAL; PERINEURAL
Status: DISPENSED
Start: 2022-11-15

## (undated) RX ORDER — IOPAMIDOL 755 MG/ML
INJECTION, SOLUTION INTRAVASCULAR
Status: DISPENSED
Start: 2022-11-15

## (undated) RX ORDER — ONDANSETRON 2 MG/ML
INJECTION INTRAMUSCULAR; INTRAVENOUS
Status: DISPENSED
Start: 2022-11-29

## (undated) RX ORDER — PROPOFOL 10 MG/ML
INJECTION, EMULSION INTRAVENOUS
Status: DISPENSED
Start: 2022-11-15

## (undated) RX ORDER — PROPOFOL 10 MG/ML
INJECTION, EMULSION INTRAVENOUS
Status: DISPENSED
Start: 2022-11-29

## (undated) RX ORDER — FENTANYL CITRATE 50 UG/ML
INJECTION, SOLUTION INTRAMUSCULAR; INTRAVENOUS
Status: DISPENSED
Start: 2022-11-15

## (undated) RX ORDER — DEXAMETHASONE SODIUM PHOSPHATE 4 MG/ML
INJECTION, SOLUTION INTRA-ARTICULAR; INTRALESIONAL; INTRAMUSCULAR; INTRAVENOUS; SOFT TISSUE
Status: DISPENSED
Start: 2022-11-29

## (undated) RX ORDER — CEFTRIAXONE SODIUM 1 G/50ML
INJECTION, SOLUTION INTRAVENOUS
Status: DISPENSED
Start: 2022-11-15

## (undated) RX ORDER — IOPAMIDOL 755 MG/ML
INJECTION, SOLUTION INTRAVASCULAR
Status: DISPENSED
Start: 2022-11-29